# Patient Record
Sex: FEMALE | Race: WHITE | NOT HISPANIC OR LATINO | Employment: FULL TIME | URBAN - METROPOLITAN AREA
[De-identification: names, ages, dates, MRNs, and addresses within clinical notes are randomized per-mention and may not be internally consistent; named-entity substitution may affect disease eponyms.]

---

## 2023-03-29 DIAGNOSIS — F33.41 RECURRENT MAJOR DEPRESSIVE DISORDER, IN PARTIAL REMISSION (CMS-HCC): Primary | ICD-10-CM

## 2023-03-29 RX ORDER — BUPROPION HYDROCHLORIDE 300 MG/1
TABLET ORAL
Qty: 30 TABLET | Refills: 0 | Status: SHIPPED | OUTPATIENT
Start: 2023-03-29 | End: 2023-05-03

## 2023-05-03 DIAGNOSIS — F33.41 RECURRENT MAJOR DEPRESSIVE DISORDER, IN PARTIAL REMISSION (CMS-HCC): ICD-10-CM

## 2023-05-03 RX ORDER — BUPROPION HYDROCHLORIDE 300 MG/1
TABLET ORAL
Qty: 30 TABLET | Refills: 0 | Status: SHIPPED | OUTPATIENT
Start: 2023-05-03 | End: 2023-07-03

## 2023-05-04 LAB
CLUE CELLS: PRESENT
NUGENT SCORE: 8
YEAST: ABNORMAL

## 2023-05-31 ENCOUNTER — HOSPITAL ENCOUNTER (OUTPATIENT)
Dept: DATA CONVERSION | Facility: HOSPITAL | Age: 48
End: 2023-05-31
Attending: SURGERY | Admitting: SURGERY
Payer: COMMERCIAL

## 2023-05-31 DIAGNOSIS — Z88.0 ALLERGY STATUS TO PENICILLIN: ICD-10-CM

## 2023-05-31 DIAGNOSIS — N60.22 FIBROADENOSIS OF LEFT BREAST: ICD-10-CM

## 2023-05-31 DIAGNOSIS — D24.2 BENIGN NEOPLASM OF LEFT BREAST: ICD-10-CM

## 2023-05-31 DIAGNOSIS — Z88.1 ALLERGY STATUS TO OTHER ANTIBIOTIC AGENTS: ICD-10-CM

## 2023-05-31 LAB
ANION GAP IN SER/PLAS: 11 MMOL/L (ref 10–20)
CALCIUM (MG/DL) IN SER/PLAS: 9 MG/DL (ref 8.6–10.3)
CARBON DIOXIDE, TOTAL (MMOL/L) IN SER/PLAS: 26 MMOL/L (ref 21–32)
CHLORIDE (MMOL/L) IN SER/PLAS: 105 MMOL/L (ref 98–107)
CREATININE (MG/DL) IN SER/PLAS: 0.6 MG/DL (ref 0.5–1.05)
ERYTHROCYTE DISTRIBUTION WIDTH (RATIO) BY AUTOMATED COUNT: 12.6 % (ref 11.5–14.5)
ERYTHROCYTE MEAN CORPUSCULAR HEMOGLOBIN CONCENTRATION (G/DL) BY AUTOMATED: 33.3 G/DL (ref 32–36)
ERYTHROCYTE MEAN CORPUSCULAR VOLUME (FL) BY AUTOMATED COUNT: 92 FL (ref 80–100)
ERYTHROCYTES (10*6/UL) IN BLOOD BY AUTOMATED COUNT: 3.93 X10E12/L (ref 4–5.2)
GFR FEMALE: >90 ML/MIN/1.73M2
GLUCOSE (MG/DL) IN SER/PLAS: 105 MG/DL (ref 74–99)
HCG, URINE: NEGATIVE
HEMATOCRIT (%) IN BLOOD BY AUTOMATED COUNT: 36 % (ref 36–46)
HEMOGLOBIN (G/DL) IN BLOOD: 12 G/DL (ref 12–16)
LEUKOCYTES (10*3/UL) IN BLOOD BY AUTOMATED COUNT: 4.2 X10E9/L (ref 4.4–11.3)
NRBC (PER 100 WBCS) BY AUTOMATED COUNT: 0 /100 WBC (ref 0–0)
PLATELETS (10*3/UL) IN BLOOD AUTOMATED COUNT: 229 X10E9/L (ref 150–450)
POTASSIUM (MMOL/L) IN SER/PLAS: 3.9 MMOL/L (ref 3.5–5.3)
SODIUM (MMOL/L) IN SER/PLAS: 138 MMOL/L (ref 136–145)
UREA NITROGEN (MG/DL) IN SER/PLAS: 8 MG/DL (ref 6–23)

## 2023-06-08 LAB
COMPLETE PATHOLOGY REPORT: NORMAL
CONVERTED CLINICAL DIAGNOSIS-HISTORY: NORMAL
CONVERTED FINAL DIAGNOSIS: NORMAL
CONVERTED FINAL REPORT PDF LINK TO COPY AND PASTE: NORMAL
CONVERTED GROSS DESCRIPTION: NORMAL

## 2023-06-27 DIAGNOSIS — F32.0 CURRENT MILD EPISODE OF MAJOR DEPRESSIVE DISORDER WITHOUT PRIOR EPISODE (CMS-HCC): Primary | ICD-10-CM

## 2023-06-27 RX ORDER — BUPROPION HYDROCHLORIDE 150 MG/1
TABLET ORAL
Qty: 30 TABLET | Refills: 0 | Status: SHIPPED | OUTPATIENT
Start: 2023-06-27 | End: 2023-07-27

## 2023-06-30 DIAGNOSIS — F33.41 RECURRENT MAJOR DEPRESSIVE DISORDER, IN PARTIAL REMISSION (CMS-HCC): ICD-10-CM

## 2023-07-03 RX ORDER — BUPROPION HYDROCHLORIDE 300 MG/1
TABLET ORAL
Qty: 30 TABLET | Refills: 0 | Status: SHIPPED | OUTPATIENT
Start: 2023-07-03 | End: 2023-08-07

## 2023-07-27 DIAGNOSIS — F32.0 CURRENT MILD EPISODE OF MAJOR DEPRESSIVE DISORDER WITHOUT PRIOR EPISODE (CMS-HCC): ICD-10-CM

## 2023-07-27 RX ORDER — BUPROPION HYDROCHLORIDE 150 MG/1
TABLET ORAL
Qty: 30 TABLET | Refills: 0 | Status: SHIPPED | OUTPATIENT
Start: 2023-07-27 | End: 2023-11-06 | Stop reason: WASHOUT

## 2023-08-04 DIAGNOSIS — F33.41 RECURRENT MAJOR DEPRESSIVE DISORDER, IN PARTIAL REMISSION (CMS-HCC): ICD-10-CM

## 2023-08-07 RX ORDER — BUPROPION HYDROCHLORIDE 300 MG/1
300 TABLET ORAL DAILY
Qty: 30 TABLET | Refills: 3 | Status: SHIPPED | OUTPATIENT
Start: 2023-08-07 | End: 2024-05-21 | Stop reason: SDUPTHER

## 2023-09-07 VITALS — WEIGHT: 145.28 LBS | BODY MASS INDEX: 22.8 KG/M2 | HEIGHT: 67 IN

## 2023-09-19 ENCOUNTER — LAB (OUTPATIENT)
Dept: LAB | Facility: LAB | Age: 48
End: 2023-09-19
Payer: COMMERCIAL

## 2023-09-19 ENCOUNTER — OFFICE VISIT (OUTPATIENT)
Dept: PRIMARY CARE | Facility: CLINIC | Age: 48
End: 2023-09-19
Payer: COMMERCIAL

## 2023-09-19 VITALS
RESPIRATION RATE: 16 BRPM | WEIGHT: 158 LBS | BODY MASS INDEX: 24.75 KG/M2 | HEART RATE: 74 BPM | OXYGEN SATURATION: 99 % | SYSTOLIC BLOOD PRESSURE: 114 MMHG | DIASTOLIC BLOOD PRESSURE: 84 MMHG | TEMPERATURE: 97.8 F

## 2023-09-19 DIAGNOSIS — R04.0 EPISTAXIS, RECURRENT: ICD-10-CM

## 2023-09-19 DIAGNOSIS — J30.1 SEASONAL ALLERGIC RHINITIS DUE TO POLLEN: Primary | ICD-10-CM

## 2023-09-19 PROBLEM — R92.8 ABNORMAL SCREENING MAMMOGRAM: Status: ACTIVE | Noted: 2023-09-19

## 2023-09-19 PROBLEM — F32.A DEPRESSION: Status: ACTIVE | Noted: 2023-09-19

## 2023-09-19 PROBLEM — J30.9 ALLERGIC RHINITIS: Status: ACTIVE | Noted: 2023-09-19

## 2023-09-19 PROBLEM — F33.9 EPISODE OF RECURRENT MAJOR DEPRESSIVE DISORDER (CMS-HCC): Status: ACTIVE | Noted: 2023-09-19

## 2023-09-19 LAB
ERYTHROCYTE DISTRIBUTION WIDTH (RATIO) BY AUTOMATED COUNT: 12.7 % (ref 11.5–14.5)
ERYTHROCYTE MEAN CORPUSCULAR HEMOGLOBIN CONCENTRATION (G/DL) BY AUTOMATED: 33.2 G/DL (ref 32–36)
ERYTHROCYTE MEAN CORPUSCULAR VOLUME (FL) BY AUTOMATED COUNT: 95 FL (ref 80–100)
ERYTHROCYTES (10*6/UL) IN BLOOD BY AUTOMATED COUNT: 4.16 X10E12/L (ref 4–5.2)
HEMATOCRIT (%) IN BLOOD BY AUTOMATED COUNT: 39.7 % (ref 36–46)
HEMOGLOBIN (G/DL) IN BLOOD: 13.2 G/DL (ref 12–16)
LEUKOCYTES (10*3/UL) IN BLOOD BY AUTOMATED COUNT: 6.6 X10E9/L (ref 4.4–11.3)
PLATELETS (10*3/UL) IN BLOOD AUTOMATED COUNT: 279 X10E9/L (ref 150–450)

## 2023-09-19 PROCEDURE — 85027 COMPLETE CBC AUTOMATED: CPT

## 2023-09-19 PROCEDURE — 36415 COLL VENOUS BLD VENIPUNCTURE: CPT

## 2023-09-19 PROCEDURE — 99213 OFFICE O/P EST LOW 20 MIN: CPT | Performed by: FAMILY MEDICINE

## 2023-09-19 RX ORDER — LEVONORGESTREL 52 MG/1
1 INTRAUTERINE DEVICE INTRAUTERINE ONCE
COMMUNITY
Start: 2019-05-08

## 2023-09-19 RX ORDER — LORATADINE 10 MG/1
10 TABLET ORAL DAILY
COMMUNITY
End: 2023-09-19

## 2023-09-19 ASSESSMENT — ENCOUNTER SYMPTOMS
CARDIOVASCULAR NEGATIVE: 1
RESPIRATORY NEGATIVE: 1
CONSTITUTIONAL NEGATIVE: 1

## 2023-09-19 NOTE — PROGRESS NOTES
Subjective   Patient ID: Sixto De La Cruz is a 48 y.o. female who presents for Epistaxis (Nose Bleed).    Patient has had nosebleeds off and on for few years.  Always on the left side.  No trauma, does have a history of allergies but she has been on allergy shots.  They have not  improved or gotten worse since she began her allergy shots.  She has not tried anything with them.  She does not pick at it.  Does not do anything that would irritate it.  She states now they are starting to happen while she is in public.  Usually she starts to feel it on the back of her throat first and started bleeding.  No headaches, no bruising anywhere else         Review of Systems   Constitutional: Negative.    HENT:  Positive for nosebleeds.    Respiratory: Negative.     Cardiovascular: Negative.        Objective   /84 (BP Location: Left arm, Patient Position: Sitting, BP Cuff Size: Adult)   Pulse 74   Temp 36.6 °C (97.8 °F)   Resp 16   Wt 71.7 kg (158 lb)   SpO2 99%   BMI 24.75 kg/m²     Physical Exam  Constitutional:       Appearance: Normal appearance.   HENT:      Head: Normocephalic.      Nose: Nose normal. No congestion or rhinorrhea.      Comments: No obvious signs of the source of her bleeding     Mouth/Throat:      Mouth: Mucous membranes are moist.   Eyes:      Pupils: Pupils are equal, round, and reactive to light.   Cardiovascular:      Rate and Rhythm: Normal rate and regular rhythm.   Pulmonary:      Effort: Pulmonary effort is normal.      Breath sounds: Normal breath sounds.   Neurological:      Mental Status: She is alert.         Assessment/Plan   Problem List Items Addressed This Visit       Allergic rhinitis - Primary     Other Visit Diagnoses       Epistaxis, recurrent        Relevant Orders    CBC    Referral to ENT          Patient is trying nasal saline twice a day.  Try to avoid any contact nose that may cause bleeding.  She will see ENT get the blood work that was ordered.  Patient aware if any  increase symptoms, any bruising, worsening bleeding, any changes notify the office or go to the emergency room.  Follow-up in 2 weeks

## 2023-09-30 NOTE — H&P
"    History & Physical Reviewed:   Pregnant/Lactating:  ·  Are You Pregnant no (1)   ·  Are You Currently Breastfeeding no (1)     I have reviewed the History and Physical dated:  23-May-2023   History and Physical reviewed and relevant findings noted. Patient examined to review pertinent physical  findings.: No significant changes   Home Medications Reviewed: no changes noted   Allergies Reviewed: no changes noted       ERAS (Enhanced Recovery After Surgery):  ·  ERAS Patient: no     Consent:   COVID-19 Consent:  ·  COVID-19 Risk Consent Surgeon has reviewed key risks related to the risk of henny COVID-19 and if they contract COVID-19 what the risks are.       Electronic Signatures:  Vanessa Gonzalez ()  (Signed 31-May-2023 08:57)   Authored: History & Physical Reviewed, ERAS, Consent,  Note Completion      Last Updated: 31-May-2023 08:57 by Vanessa Gonzalez ()    References:  1.  Data Referenced From \"Patient Profile - Preop v3\" 31-May-2023 07:32   "

## 2023-10-02 NOTE — OP NOTE
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 LEFT MAG SEED LOCALIZED EXCISIONAL BREAST BIOPSY    Anesthesia: No anesthesiologist associated with this case  Estimated Blood Loss: 2ml  Findings: 1. seed, clip present on specimen xray        Operative Report:   The patient underwent pre-operative magnetic seed localization in the radiology department prior to surgery.  Localization studies were reviewed confirming  appropriate localization.  The patient was then transported to the operative suite where a sign-in was performed verifying patient identity, procedure and laterality.  One dose of preoperative antibiotics was given.  After induction of anesthesia the  left breast and axilla were prepped and draped in the usual sterile fashion.  The sentimag probe was balanced and then used to identify the non-palpable lesion in the breast when the target area marked on the skin.  A periareolar incision was then made,  and flaps were raised anteriorly in the direction of the targeted lesion.  Probe was used to confirm the presence of the lesion.  Circumferential dissection was carried out to include the targeted lesion and localization seed.  The probe was used to again  confirm the presence of the localization seed within the specimen. Once the specimen was completed dissected it was oriented with a silk suture- short suture superior, long suture lateral and passed off the field.  A specimen radiograph was performed  which confirmed the presence of the lesion, biopsy clip, and localization seed.  One medium surgical clip was used to denote the area of resection. No additional nipple discharge observed after excision of the mass. The dermis was closed with 3-0 Vicryl  suture and the skin was closed with a running 4-0  Monocryl.  Benzoin and steri-strips were used as dressing.  The patient was then awoken from anesthesia and transported to the PACU in satisfactory condition.    SPECIMEN:  1. Left breast magseed localized excisional biopsy- short suture superior, long lateral                        Attestation:   Note Completion:  Attending Attestation I performed the procedure without a resident         Electronic Signatures:  Vanessa Gonzalez ()  (Signed 31-May-2023 09:59)   Authored: Post-Operative Note, Chart Review, Note Completion      Last Updated: 31-May-2023 09:59 by Vanessa Gonzalez ()

## 2023-10-18 PROBLEM — G47.00 INSOMNIA: Status: ACTIVE | Noted: 2023-10-18
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
## 2023-10-18 PROBLEM — R53.83 FATIGUE: Status: ACTIVE | Noted: 2023-10-18

## 2023-10-18 PROBLEM — N64.52 NIPPLE DISCHARGE: Status: ACTIVE | Noted: 2023-10-18

## 2023-10-18 PROBLEM — S82.309A FRACTURE OF DISTAL END OF TIBIA: Status: ACTIVE | Noted: 2017-03-28

## 2023-10-18 PROBLEM — M94.262 CHONDROMALACIA, KNEE, LEFT: Status: ACTIVE | Noted: 2017-12-29

## 2023-10-18 PROBLEM — R60.0 LOCALIZED EDEMA: Status: ACTIVE | Noted: 2023-10-18

## 2023-10-18 PROBLEM — S82.432D: Status: ACTIVE | Noted: 2017-04-14

## 2023-10-18 PROBLEM — L98.9 SKIN LESION OF BACK: Status: ACTIVE | Noted: 2023-10-18

## 2023-10-18 PROBLEM — D24.2 INTRADUCTAL PAPILLOMA OF LEFT BREAST: Status: ACTIVE | Noted: 2023-10-18

## 2023-10-18 PROBLEM — T75.3XXA MOTION SICKNESS: Status: ACTIVE | Noted: 2023-10-18

## 2023-10-18 PROBLEM — R35.0 URINARY FREQUENCY: Status: ACTIVE | Noted: 2023-10-18

## 2023-10-18 PROBLEM — B96.89 BACTERIAL VAGINOSIS: Status: ACTIVE | Noted: 2023-10-18

## 2023-10-18 PROBLEM — N76.0 VAGINITIS: Status: ACTIVE | Noted: 2023-10-18

## 2023-10-18 PROBLEM — M79.89 AXILLARY SWELLING: Status: ACTIVE | Noted: 2023-10-18

## 2023-10-18 PROBLEM — H91.90 HEARING LOSS: Status: ACTIVE | Noted: 2023-10-18

## 2023-10-18 PROBLEM — E55.9 VITAMIN D DEFICIENCY: Status: ACTIVE | Noted: 2023-10-18

## 2023-10-18 PROBLEM — H93.19 TINNITUS: Status: ACTIVE | Noted: 2023-10-18

## 2023-10-18 PROBLEM — N76.0 BACTERIAL VAGINOSIS: Status: ACTIVE | Noted: 2023-10-18

## 2023-10-18 RX ORDER — ASPIRIN 81 MG/1
81 TABLET ORAL DAILY
COMMUNITY
End: 2023-10-30 | Stop reason: HOSPADM

## 2023-10-18 RX ORDER — BUPROPION HYDROCHLORIDE 150 MG/1
1 TABLET ORAL DAILY
COMMUNITY
Start: 2019-09-09 | End: 2023-11-20

## 2023-10-18 RX ORDER — FLUOXETINE 10 MG/1
10 CAPSULE ORAL
COMMUNITY
Start: 2015-08-31 | End: 2023-10-30 | Stop reason: HOSPADM

## 2023-10-19 ENCOUNTER — APPOINTMENT (OUTPATIENT)
Dept: OTOLARYNGOLOGY | Facility: CLINIC | Age: 48
End: 2023-10-19
Payer: COMMERCIAL

## 2023-10-30 ENCOUNTER — OFFICE VISIT (OUTPATIENT)
Dept: OTOLARYNGOLOGY | Facility: CLINIC | Age: 48
End: 2023-10-30
Payer: COMMERCIAL

## 2023-10-30 VITALS
RESPIRATION RATE: 18 BRPM | BODY MASS INDEX: 23.54 KG/M2 | DIASTOLIC BLOOD PRESSURE: 80 MMHG | WEIGHT: 150 LBS | SYSTOLIC BLOOD PRESSURE: 117 MMHG | TEMPERATURE: 97.9 F | HEART RATE: 61 BPM | HEIGHT: 67 IN

## 2023-10-30 DIAGNOSIS — R04.0 EPISTAXIS, RECURRENT: Primary | ICD-10-CM

## 2023-10-30 DIAGNOSIS — J34.2 NASAL SEPTAL DEVIATION: ICD-10-CM

## 2023-10-30 PROCEDURE — 99213 OFFICE O/P EST LOW 20 MIN: CPT | Performed by: STUDENT IN AN ORGANIZED HEALTH CARE EDUCATION/TRAINING PROGRAM

## 2023-10-30 PROCEDURE — 30901 CONTROL OF NOSEBLEED: CPT | Performed by: STUDENT IN AN ORGANIZED HEALTH CARE EDUCATION/TRAINING PROGRAM

## 2023-10-30 PROCEDURE — 99203 OFFICE O/P NEW LOW 30 MIN: CPT | Performed by: STUDENT IN AN ORGANIZED HEALTH CARE EDUCATION/TRAINING PROGRAM

## 2023-10-30 PROCEDURE — 1036F TOBACCO NON-USER: CPT | Performed by: STUDENT IN AN ORGANIZED HEALTH CARE EDUCATION/TRAINING PROGRAM

## 2023-10-30 RX ORDER — MUPIROCIN 20 MG/G
OINTMENT TOPICAL
Qty: 15 G | Refills: 1 | Status: SHIPPED | OUTPATIENT
Start: 2023-10-30 | End: 2024-05-21

## 2023-10-30 ASSESSMENT — PATIENT HEALTH QUESTIONNAIRE - PHQ9
SUM OF ALL RESPONSES TO PHQ9 QUESTIONS 1 AND 2: 0
1. LITTLE INTEREST OR PLEASURE IN DOING THINGS: NOT AT ALL
2. FEELING DOWN, DEPRESSED OR HOPELESS: NOT AT ALL

## 2023-10-30 ASSESSMENT — COLUMBIA-SUICIDE SEVERITY RATING SCALE - C-SSRS
6. HAVE YOU EVER DONE ANYTHING, STARTED TO DO ANYTHING, OR PREPARED TO DO ANYTHING TO END YOUR LIFE?: NO
2. HAVE YOU ACTUALLY HAD ANY THOUGHTS OF KILLING YOURSELF?: NO
1. IN THE PAST MONTH, HAVE YOU WISHED YOU WERE DEAD OR WISHED YOU COULD GO TO SLEEP AND NOT WAKE UP?: NO

## 2023-10-30 ASSESSMENT — ENCOUNTER SYMPTOMS
DEPRESSION: 0
OCCASIONAL FEELINGS OF UNSTEADINESS: 0
LOSS OF SENSATION IN FEET: 0

## 2023-10-30 ASSESSMENT — PAIN SCALES - GENERAL: PAINLEVEL: 0-NO PAIN

## 2023-10-30 NOTE — LETTER
October 30, 2023     [place]  [address]  [facility], [address]  [facility] [address]    Patient: Sixto De La Cruz   YOB: 1975   Date of Visit: 10/30/2023       Dear Dr. Sushant Donahue, DO:    Thank you for referring Sixto De La Cruz to me for evaluation. Below are my notes for this consultation.  If you have questions, please do not hesitate to call me. I look forward to following your patient along with you.       Sincerely,     Malvin Bhat MD      CC: No Recipients  ______________________________________________________________________________________    SUBJECTIVE  Patient ID: Sixto De La Cruz is a 48 y.o. female who presents for New Patient Visit (Epistaxis recurrent/).    Referred by Dr. Donahue.    The patient reports left-sided epistaxis. Has been an issue for about six years.  Occurs roughly once a month.  She reports that she will get isolated episodes of significant bleeding, usually at awkward times.  Last bleed was last weekend. She is not on any AC; chart reports ASA which she denies. Typically has low blood pressure. No family history of epistaxis.  Has used saline spray in the past.    Review of Systems  Complete ROS negative except as noted above or on patient intake form and as above.    OBJECTIVE  Physical Exam  CONSTITUTIONAL: Well appearing female who appears stated age.  PSYCHIATRIC: Alert, appropriate mood and affect.  RESPIRATORY: Normal inspiration and expiration and chest wall expansion; no use of accessory muscles to breathe.  VOICE: Clear speech without hoarseness. No stridor nor stertor.  HEAD, FACE, AND SKIN: Symmetric facial features.  EARS: External ears were normally formed with no lesions. The external auditory canals were clear. The tympanic membranes were intact and in the neutral position. No significant retraction pockets, effusions, nor hemotympanum were appreciated.  NOSE: Nasal dorsum was midline. Anterior rhinoscopy  demonstrated a septum deviated to the left.  There is some prominent blush versus superficial capillaries along the superior septum overlying the swell body.  Otherwise no obvious nasal masses, polyps, mucopurulence, nor other lesions were appreciated.  OROPHARYNX: No lesion nor mucosal abnormality. The uvula was normal appearing.  No blood in the oropharynx.    --------------------------------------------------  Procedure: Control of epistaxis, simple  Indication: Epistaxis, left   Informed consent verbally obtained: The risks, benefits, alternatives, and expectations were discussed with the patient, who wished to proceed  Anesthesia: Topical lidocaine and oxymetazoline    After anesthesia and decongestion the nasal cavities were examined with a a headlight.  Silver nitrate cautery was applied to the left septum.  Once hemostasis was achieved the silver nitrate was deactivated with the use of saline.  Surgicel was then draped over the area of cautery to aid in healing.  Finally, bacitracin ointment was applied to the nasal sill.    There were no complications and the patient tolerated the procedure well.  --------------------------------------------------    ASSESSMENT/PLAN  Diagnoses and all orders for this visit:  Epistaxis, recurrent  -     Referral to ENT  -     mupirocin (Bactroban) 2 % ointment; Apply a pea-sized amount to bilateral nostril(s) every 12 hours.  Nasal septal deviation    48 y.o. female referred for recurrent left-sided epistaxis.    1.  Recurrent left-sided epistaxis, prominent vessel area along anterior septum  We discussed the patient's physical exam findings and I offered reassurance.  The patient has evidence of bleeding from an area along the superior anterior septum just posterior to Kiesselbach's plexus.  This is an area located along the swell body of the nasal septum.  We discussed the patient's options at this time, which include continued observation, medical therapy, and or  procedural intervention.  The patient opted for procedural intervention and cautery of this area was successfully performed.    Written instruction regarding post-visit care as well as instructions on how to manage future epistaxis episodes was provided.    I recommended follow-up in 3 to 4 weeks to check on her healing.  If she does well she can follow-up with me as needed.    This note was created using speech recognition transcription software. Despite proofreading, typographical errors may be present that affect the meaning of the content. Please contact my office with any questions.

## 2023-10-30 NOTE — PROGRESS NOTES
SUBJECTIVE  Patient ID: Sixto De La Cruz is a 48 y.o. female who presents for New Patient Visit (Epistaxis recurrent/).    Referred by Dr. Donahue.    The patient reports left-sided epistaxis. Has been an issue for about six years.  Occurs roughly once a month.  She reports that she will get isolated episodes of significant bleeding, usually at awkward times.  Last bleed was last weekend. She is not on any AC; chart reports ASA which she denies. Typically has low blood pressure. No family history of epistaxis.  Has used saline spray in the past.    Review of Systems  Complete ROS negative except as noted above or on patient intake form and as above.    OBJECTIVE  Physical Exam  CONSTITUTIONAL: Well appearing female who appears stated age.  PSYCHIATRIC: Alert, appropriate mood and affect.  RESPIRATORY: Normal inspiration and expiration and chest wall expansion; no use of accessory muscles to breathe.  VOICE: Clear speech without hoarseness. No stridor nor stertor.  HEAD, FACE, AND SKIN: Symmetric facial features.  EARS: External ears were normally formed with no lesions. The external auditory canals were clear. The tympanic membranes were intact and in the neutral position. No significant retraction pockets, effusions, nor hemotympanum were appreciated.  NOSE: Nasal dorsum was midline. Anterior rhinoscopy demonstrated a septum deviated to the left.  There is some prominent blush versus superficial capillaries along the superior septum overlying the swell body.  Otherwise no obvious nasal masses, polyps, mucopurulence, nor other lesions were appreciated.  OROPHARYNX: No lesion nor mucosal abnormality. The uvula was normal appearing.  No blood in the oropharynx.    --------------------------------------------------  Procedure: Control of epistaxis, simple  Indication: Epistaxis, left   Informed consent verbally obtained: The risks, benefits, alternatives, and expectations were discussed with the patient, who wished to  proceed  Anesthesia: Topical lidocaine and oxymetazoline    After anesthesia and decongestion the nasal cavities were examined with a a headlight.  Silver nitrate cautery was applied to the left septum.  Once hemostasis was achieved the silver nitrate was deactivated with the use of saline.  Surgicel was then draped over the area of cautery to aid in healing.  Finally, bacitracin ointment was applied to the nasal sill.    There were no complications and the patient tolerated the procedure well.  --------------------------------------------------    ASSESSMENT/PLAN  Diagnoses and all orders for this visit:  Epistaxis, recurrent  -     Referral to ENT  -     mupirocin (Bactroban) 2 % ointment; Apply a pea-sized amount to bilateral nostril(s) every 12 hours.  Nasal septal deviation    48 y.o. female referred for recurrent left-sided epistaxis.    1.  Recurrent left-sided epistaxis, prominent vessel area along anterior septum  We discussed the patient's physical exam findings and I offered reassurance.  The patient has evidence of bleeding from an area along the superior anterior septum just posterior to Kiesselbach's plexus.  This is an area located along the swell body of the nasal septum.  We discussed the patient's options at this time, which include continued observation, medical therapy, and or procedural intervention.  The patient opted for procedural intervention and cautery of this area was successfully performed.    Written instruction regarding post-visit care as well as instructions on how to manage future epistaxis episodes was provided.    I recommended follow-up in 3 to 4 weeks to check on her healing.  If she does well she can follow-up with me as needed.    This note was created using speech recognition transcription software. Despite proofreading, typographical errors may be present that affect the meaning of the content. Please contact my office with any questions.

## 2023-11-06 ENCOUNTER — OFFICE VISIT (OUTPATIENT)
Dept: OBSTETRICS AND GYNECOLOGY | Facility: CLINIC | Age: 48
End: 2023-11-06
Payer: COMMERCIAL

## 2023-11-06 VITALS
HEIGHT: 67 IN | WEIGHT: 161.13 LBS | BODY MASS INDEX: 25.29 KG/M2 | DIASTOLIC BLOOD PRESSURE: 70 MMHG | SYSTOLIC BLOOD PRESSURE: 110 MMHG

## 2023-11-06 DIAGNOSIS — L91.8 SKIN TAG: ICD-10-CM

## 2023-11-06 PROCEDURE — 99213 OFFICE O/P EST LOW 20 MIN: CPT | Performed by: OBSTETRICS & GYNECOLOGY

## 2023-11-06 PROCEDURE — 1036F TOBACCO NON-USER: CPT | Performed by: OBSTETRICS & GYNECOLOGY

## 2023-11-06 RX ORDER — LIDOCAINE HYDROCHLORIDE 10 MG/ML
3 INJECTION INFILTRATION; PERINEURAL ONCE
Status: DISCONTINUED | OUTPATIENT
Start: 2023-11-06 | End: 2023-11-15

## 2023-11-06 RX ADMIN — LIDOCAINE HYDROCHLORIDE 3 ML: 10 INJECTION, SOLUTION EPIDURAL; INFILTRATION; INTRACAUDAL; PERINEURAL at 12:37

## 2023-11-06 NOTE — PROGRESS NOTES
Patient ID: Sixto De La Cruz is a 48 y.o. female.    Biopsy vulva    Date/Time: 11/6/2023 10:48 AM    Performed by: Odalys Munguia MD  Authorized by: Odalys Munguia MD    Consent:     Consent obtained:  Verbal and written    Consent given by:  Patient    Risks discussed:  Bleeding, infection and pain  Universal protocol:     Procedure explained and questions answered to patient or proxy's satisfaction: yes      Site/side marked: yes      Patient identity confirmed:  Verbally with patient  Pre-procedure details:     Skin preparation:  Povidone-iodine  Anesthesia:     Anesthesia method:  Local infiltration  Post-procedure details:     Procedure completion:  Tolerated well, no immediate complications     Wants hymenal skin tag removed.    Iodine skin prep.   1 % lidocaine . Lot# [id number] Exp: 04/2025   Excised middle tag w small scissors . 3 - 0 sutures placed x 3 . Good hemostasis.   Follow up 2 weeks.    Odalys Munguia MD

## 2023-11-18 DIAGNOSIS — F32.0 CURRENT MILD EPISODE OF MAJOR DEPRESSIVE DISORDER WITHOUT PRIOR EPISODE (CMS-HCC): Primary | ICD-10-CM

## 2023-11-19 RX ORDER — LIDOCAINE HYDROCHLORIDE 10 MG/ML
3 INJECTION INFILTRATION; PERINEURAL ONCE
Status: DISCONTINUED | OUTPATIENT
Start: 2023-11-19 | End: 2024-01-18

## 2023-11-20 ENCOUNTER — OFFICE VISIT (OUTPATIENT)
Dept: OBSTETRICS AND GYNECOLOGY | Facility: CLINIC | Age: 48
End: 2023-11-20
Payer: COMMERCIAL

## 2023-11-20 VITALS
BODY MASS INDEX: 25.74 KG/M2 | WEIGHT: 164 LBS | SYSTOLIC BLOOD PRESSURE: 112 MMHG | HEIGHT: 67 IN | DIASTOLIC BLOOD PRESSURE: 80 MMHG

## 2023-11-20 DIAGNOSIS — L91.8 SKIN TAG: Primary | ICD-10-CM

## 2023-11-20 PROCEDURE — 99213 OFFICE O/P EST LOW 20 MIN: CPT | Performed by: OBSTETRICS & GYNECOLOGY

## 2023-11-20 PROCEDURE — 56515 DESTROY VULVA LESION/S COMPL: CPT | Performed by: OBSTETRICS & GYNECOLOGY

## 2023-11-20 PROCEDURE — 1036F TOBACCO NON-USER: CPT | Performed by: OBSTETRICS & GYNECOLOGY

## 2023-11-20 RX ORDER — BUPROPION HYDROCHLORIDE 150 MG/1
TABLET ORAL
Qty: 30 TABLET | Refills: 0 | Status: SHIPPED | OUTPATIENT
Start: 2023-11-20 | End: 2023-12-19

## 2023-11-20 RX ORDER — LIDOCAINE HYDROCHLORIDE 10 MG/ML
5 INJECTION INFILTRATION; PERINEURAL ONCE
Status: DISCONTINUED | OUTPATIENT
Start: 2023-11-20 | End: 2024-01-18

## 2023-11-20 RX ADMIN — LIDOCAINE HYDROCHLORIDE 3 ML: 10 INJECTION, SOLUTION EPIDURAL; INFILTRATION; INTRACAUDAL; PERINEURAL at 12:40

## 2023-11-20 NOTE — PROGRESS NOTES
Subjective   Patient ID: Sixto De La Cruz is a 48 y.o. female who presents for Skin Tag.  HPI  Has 2 hymenal skin tags she wants removed, present there x 6 months.  I removed a third one 2 weeks ago.   No pain or bleeding from this site.      Review of Systems  Neg    Objective   Physical Exam  Physical Exam  Constitutional:       Appearance: Normal appearance.     Abdominal:      General: Abdomen is flat.      Palpations: Abdomen is soft. There is no mass.   Genitourinary:     Labia:  No lesions  and No rash.       Urethra: No urethral lesion.      Vagina: Normal mucosa, pink and no discharge. Two enlarged hymenal skin tags             Assessment/Plan   Follow up in 2 weeks   SKIN TAG REMOVAL ( not biopsy)   Biopsy vulva    Date/Time: 11/20/2023 12:22 PM    Performed by: Odalys Munguia MD  Authorized by: Odalys Munguia MD    Consent:     Consent obtained:  Verbal    Consent given by:  Patient    Risks, benefits, and alternatives were discussed: yes      Risks discussed:  Bleeding, infection and pain    Alternatives discussed:  No treatment  Universal protocol:     Procedure explained and questions answered to patient or proxy's satisfaction: yes      Site/side marked: yes    Indications:     Indications:  Painful hymenal skin tags  Pre-procedure details:     Skin preparation:  Povidone-iodine  Sedation:     Sedation type:  None  Anesthesia:     Anesthesia method:  Local infiltration    Local anesthetic:  Lidocaine 1% w/o epi  Procedure specific details:      2 Isolated hymenal tags on each side.   Numbed w 2.5cc Lidocaine each.  Excised w sharp scissors. Sutured base w 4-0 vicryl sutures. Good hemostasis   Post-procedure details:     Procedure completion:  Tolerated well, no immediate complications       Odalys Munguia MD

## 2023-11-21 ENCOUNTER — APPOINTMENT (OUTPATIENT)
Dept: OTOLARYNGOLOGY | Facility: CLINIC | Age: 48
End: 2023-11-21
Payer: COMMERCIAL

## 2023-12-04 ENCOUNTER — OFFICE VISIT (OUTPATIENT)
Dept: OBSTETRICS AND GYNECOLOGY | Facility: CLINIC | Age: 48
End: 2023-12-04
Payer: COMMERCIAL

## 2023-12-04 VITALS
SYSTOLIC BLOOD PRESSURE: 114 MMHG | WEIGHT: 164.25 LBS | BODY MASS INDEX: 25.73 KG/M2 | DIASTOLIC BLOOD PRESSURE: 78 MMHG

## 2023-12-04 DIAGNOSIS — L91.8 SKIN TAG: Primary | ICD-10-CM

## 2023-12-04 PROCEDURE — 1036F TOBACCO NON-USER: CPT | Performed by: OBSTETRICS & GYNECOLOGY

## 2023-12-04 PROCEDURE — 99213 OFFICE O/P EST LOW 20 MIN: CPT | Performed by: OBSTETRICS & GYNECOLOGY

## 2023-12-04 NOTE — PROGRESS NOTES
Subjective   Patient ID: Sixto De La Cruz is a 48 y.o. female who presents for Suture / Staple Removal. Suture check from skin tag removal on 11/20/23.    HPI  Had hymenal tags removed. Doing well.     Review of Systems  Neg   Objective   Physical Exam  Small sutures still present on hymen ring.    No infection. No discharge. No lesions.     Assessment/Plan   Diagnoses and all orders for this visit:  Skin tag    All sutures removed fr skin on hymen. Mucosa flat and well healed. Some spotting from suture removal.     Follow up yearly    Odalys Munguia MD

## 2023-12-19 DIAGNOSIS — F32.0 CURRENT MILD EPISODE OF MAJOR DEPRESSIVE DISORDER WITHOUT PRIOR EPISODE (CMS-HCC): ICD-10-CM

## 2023-12-19 RX ORDER — BUPROPION HYDROCHLORIDE 150 MG/1
TABLET ORAL
Qty: 30 TABLET | Refills: 0 | Status: SHIPPED | OUTPATIENT
Start: 2023-12-19 | End: 2024-05-21 | Stop reason: SDUPTHER

## 2023-12-22 ENCOUNTER — TELEPHONE (OUTPATIENT)
Dept: OBSTETRICS AND GYNECOLOGY | Facility: CLINIC | Age: 48
End: 2023-12-22
Payer: COMMERCIAL

## 2023-12-22 DIAGNOSIS — N89.8 VAGINAL DISCHARGE: Primary | ICD-10-CM

## 2023-12-22 NOTE — TELEPHONE ENCOUNTER
Patient called in wanting to know if they can get a prescription for vaginosis. Patient said they were recommended a gel and just wanted information on the gel. Please advise.       No recent sex, white discharge x 2 weeks w sour odor. Ok to trt w metrogel x 5 days.    Odalys Munguia MD

## 2023-12-27 RX ORDER — METRONIDAZOLE 7.5 MG/G
GEL VAGINAL DAILY
Qty: 70 G | Refills: 0 | Status: SHIPPED | OUTPATIENT
Start: 2023-12-27 | End: 2024-01-01

## 2024-01-18 RX ORDER — LIDOCAINE HYDROCHLORIDE 10 MG/ML
3 INJECTION, SOLUTION EPIDURAL; INFILTRATION; INTRACAUDAL; PERINEURAL ONCE
Status: COMPLETED | OUTPATIENT
Start: 2024-01-18 | End: 2023-11-20

## 2024-01-18 RX ORDER — LIDOCAINE HYDROCHLORIDE 10 MG/ML
3 INJECTION, SOLUTION EPIDURAL; INFILTRATION; INTRACAUDAL; PERINEURAL ONCE
Status: COMPLETED | OUTPATIENT
Start: 2024-01-18 | End: 2023-11-06

## 2024-05-21 ENCOUNTER — LAB (OUTPATIENT)
Dept: LAB | Facility: LAB | Age: 49
End: 2024-05-21
Payer: COMMERCIAL

## 2024-05-21 ENCOUNTER — OFFICE VISIT (OUTPATIENT)
Dept: PRIMARY CARE | Facility: CLINIC | Age: 49
End: 2024-05-21
Payer: COMMERCIAL

## 2024-05-21 VITALS
BODY MASS INDEX: 26.47 KG/M2 | SYSTOLIC BLOOD PRESSURE: 102 MMHG | TEMPERATURE: 98.4 F | DIASTOLIC BLOOD PRESSURE: 74 MMHG | RESPIRATION RATE: 16 BRPM | HEART RATE: 84 BPM | OXYGEN SATURATION: 97 % | WEIGHT: 169 LBS

## 2024-05-21 DIAGNOSIS — F33.41 RECURRENT MAJOR DEPRESSIVE DISORDER, IN PARTIAL REMISSION (CMS-HCC): ICD-10-CM

## 2024-05-21 DIAGNOSIS — Z00.00 HEALTHCARE MAINTENANCE: Primary | ICD-10-CM

## 2024-05-21 DIAGNOSIS — F32.0 CURRENT MILD EPISODE OF MAJOR DEPRESSIVE DISORDER WITHOUT PRIOR EPISODE (CMS-HCC): ICD-10-CM

## 2024-05-21 DIAGNOSIS — F33.0 MILD EPISODE OF RECURRENT MAJOR DEPRESSIVE DISORDER (CMS-HCC): ICD-10-CM

## 2024-05-21 DIAGNOSIS — Z00.00 HEALTHCARE MAINTENANCE: ICD-10-CM

## 2024-05-21 LAB
ALBUMIN SERPL BCP-MCNC: 4.3 G/DL (ref 3.4–5)
ALP SERPL-CCNC: 40 U/L (ref 33–110)
ALT SERPL W P-5'-P-CCNC: 11 U/L (ref 7–45)
ANION GAP SERPL CALC-SCNC: 10 MMOL/L (ref 10–20)
AST SERPL W P-5'-P-CCNC: 17 U/L (ref 9–39)
BILIRUB SERPL-MCNC: 0.6 MG/DL (ref 0–1.2)
BUN SERPL-MCNC: 10 MG/DL (ref 6–23)
CALCIUM SERPL-MCNC: 9 MG/DL (ref 8.6–10.3)
CHLORIDE SERPL-SCNC: 103 MMOL/L (ref 98–107)
CHOLEST SERPL-MCNC: 215 MG/DL (ref 0–199)
CHOLESTEROL/HDL RATIO: 2.6
CO2 SERPL-SCNC: 26 MMOL/L (ref 21–32)
CREAT SERPL-MCNC: 0.76 MG/DL (ref 0.5–1.05)
EGFRCR SERPLBLD CKD-EPI 2021: >90 ML/MIN/1.73M*2
ERYTHROCYTE [DISTWIDTH] IN BLOOD BY AUTOMATED COUNT: 13.1 % (ref 11.5–14.5)
GLUCOSE SERPL-MCNC: 92 MG/DL (ref 74–99)
HCT VFR BLD AUTO: 37.5 % (ref 36–46)
HDLC SERPL-MCNC: 83.7 MG/DL
HGB BLD-MCNC: 12.6 G/DL (ref 12–16)
LDLC SERPL CALC-MCNC: 90 MG/DL
MCH RBC QN AUTO: 31.7 PG (ref 26–34)
MCHC RBC AUTO-ENTMCNC: 33.6 G/DL (ref 32–36)
MCV RBC AUTO: 94 FL (ref 80–100)
NON HDL CHOLESTEROL: 131 MG/DL (ref 0–149)
NRBC BLD-RTO: 0 /100 WBCS (ref 0–0)
PLATELET # BLD AUTO: 228 X10*3/UL (ref 150–450)
POTASSIUM SERPL-SCNC: 3.9 MMOL/L (ref 3.5–5.3)
PROT SERPL-MCNC: 7 G/DL (ref 6.4–8.2)
RBC # BLD AUTO: 3.98 X10*6/UL (ref 4–5.2)
SODIUM SERPL-SCNC: 135 MMOL/L (ref 136–145)
TRIGL SERPL-MCNC: 209 MG/DL (ref 0–149)
VLDL: 42 MG/DL (ref 0–40)
WBC # BLD AUTO: 6.6 X10*3/UL (ref 4.4–11.3)

## 2024-05-21 PROCEDURE — 99396 PREV VISIT EST AGE 40-64: CPT | Performed by: FAMILY MEDICINE

## 2024-05-21 PROCEDURE — 80053 COMPREHEN METABOLIC PANEL: CPT

## 2024-05-21 PROCEDURE — 80061 LIPID PANEL: CPT

## 2024-05-21 PROCEDURE — 1036F TOBACCO NON-USER: CPT | Performed by: FAMILY MEDICINE

## 2024-05-21 PROCEDURE — 36415 COLL VENOUS BLD VENIPUNCTURE: CPT

## 2024-05-21 PROCEDURE — 85027 COMPLETE CBC AUTOMATED: CPT

## 2024-05-21 RX ORDER — BUPROPION HYDROCHLORIDE 150 MG/1
TABLET ORAL
Qty: 90 TABLET | Refills: 1 | Status: SHIPPED | OUTPATIENT
Start: 2024-05-21

## 2024-05-21 RX ORDER — BUPROPION HYDROCHLORIDE 300 MG/1
300 TABLET ORAL DAILY
Qty: 90 TABLET | Refills: 1 | Status: SHIPPED | OUTPATIENT
Start: 2024-05-21

## 2024-05-21 ASSESSMENT — ENCOUNTER SYMPTOMS
HEMATOLOGIC/LYMPHATIC NEGATIVE: 1
RESPIRATORY NEGATIVE: 1
COUGH: 0
MUSCULOSKELETAL NEGATIVE: 1
ENDOCRINE NEGATIVE: 1
ALLERGIC/IMMUNOLOGIC NEGATIVE: 1
VOMITING: 0
WHEEZING: 0
DIZZINESS: 0
EYES NEGATIVE: 1
CONSTIPATION: 0
CARDIOVASCULAR NEGATIVE: 1
SHORTNESS OF BREATH: 0
PSYCHIATRIC NEGATIVE: 1
DIARRHEA: 0
NUMBNESS: 0
CONSTITUTIONAL NEGATIVE: 1
WEAKNESS: 0
NEUROLOGICAL NEGATIVE: 1
FEVER: 0
ABDOMINAL PAIN: 0
HEADACHES: 0
GASTROINTESTINAL NEGATIVE: 1

## 2024-05-21 NOTE — PROGRESS NOTES
Subjective   Patient ID: Sixto De La Cruz is a 48 y.o. female who presents for Med Refill.    Patient overall is doing okay.  No chest pain or shortness of breath.  She still has some days where depression seems a little worse.  But she does not take the Wellbutrin every day either.  No SI or HI thoughts.  She is has more stress at home with 2 teenagers.  Normal bowel bladder.  She sees OB.  She is due for mammogram.    Med Refill  Pertinent negatives include no abdominal pain, chest pain, coughing, fever, headaches, numbness, rash, vomiting or weakness.        Review of Systems   Constitutional: Negative.  Negative for fever.   HENT: Negative.     Eyes: Negative.    Respiratory: Negative.  Negative for cough, shortness of breath and wheezing.    Cardiovascular: Negative.  Negative for chest pain and leg swelling.   Gastrointestinal: Negative.  Negative for abdominal pain, constipation, diarrhea and vomiting.   Endocrine: Negative.    Genitourinary: Negative.    Musculoskeletal: Negative.    Skin: Negative.  Negative for rash.   Allergic/Immunologic: Negative.    Neurological: Negative.  Negative for dizziness, weakness, numbness and headaches.   Hematological: Negative.    Psychiatric/Behavioral: Negative.         Objective   /74 (BP Location: Left arm, Patient Position: Sitting, BP Cuff Size: Adult)   Pulse 84   Temp 36.9 °C (98.4 °F)   Resp 16   Wt 76.7 kg (169 lb)   SpO2 97%   BMI 26.47 kg/m²     Physical Exam  Vitals and nursing note reviewed.   Constitutional:       General: She is not in acute distress.     Appearance: Normal appearance. She is not ill-appearing.   HENT:      Head: Normocephalic.      Right Ear: Tympanic membrane and ear canal normal.      Left Ear: Tympanic membrane and ear canal normal.      Nose: Nose normal.      Mouth/Throat:      Mouth: Mucous membranes are moist.      Pharynx: Oropharynx is clear.   Eyes:      Extraocular Movements: Extraocular movements intact.       Conjunctiva/sclera: Conjunctivae normal.      Pupils: Pupils are equal, round, and reactive to light.   Cardiovascular:      Rate and Rhythm: Normal rate and regular rhythm.      Pulses: Normal pulses.   Pulmonary:      Effort: Pulmonary effort is normal. No respiratory distress.      Breath sounds: No wheezing, rhonchi or rales.   Abdominal:      General: Bowel sounds are normal.      Palpations: Abdomen is soft.      Tenderness: There is no guarding.      Hernia: No hernia is present.   Musculoskeletal:         General: Normal range of motion.      Cervical back: Neck supple.      Right lower leg: No edema.      Left lower leg: No edema.   Skin:     General: Skin is warm.      Capillary Refill: Capillary refill takes less than 2 seconds.   Neurological:      General: No focal deficit present.      Mental Status: She is oriented to person, place, and time.      Cranial Nerves: No cranial nerve deficit.      Sensory: No sensory deficit.      Gait: Gait normal.      Deep Tendon Reflexes: Reflexes normal.   Psychiatric:         Mood and Affect: Mood normal.         Behavior: Behavior normal.         Judgment: Judgment normal.         Assessment/Plan   Problem List Items Addressed This Visit       Depression    Relevant Medications    buPROPion XL (Wellbutrin XL) 300 mg 24 hr tablet    buPROPion XL (Wellbutrin XL) 150 mg 24 hr tablet    Episode of recurrent major depressive disorder (CMS-HCC)     Other Visit Diagnoses       Healthcare maintenance    -  Primary    Relevant Orders    CBC    Comprehensive Metabolic Panel    Lipid Panel    BI mammo bilateral screening tomosynthesis    Colonoscopy Screening; Average Risk Patient          Patient is can to see psychology.  2 names given for different groups.  We may need to adjust her medication.  Patient aware if any chest pain shortness of breath any nausea vomiting diarrhea fever headache any SI or HI thoughts any harmful thoughts or self or others notify the office to go  to the ER  Follow-up in 3 months

## 2024-05-22 NOTE — RESULT ENCOUNTER NOTE
Sixto your glucose, liver and kidney function appeared fine  Your blood counts were normal no anemia  Your total cholesterol was 215 but your good cholesterol was 83 which is really good.  Your triglycerides are slightly elevated so can work on trying to lower carbohydrates intake

## 2024-06-10 ENCOUNTER — HOSPITAL ENCOUNTER (OUTPATIENT)
Dept: RADIOLOGY | Facility: HOSPITAL | Age: 49
Discharge: HOME | End: 2024-06-10
Payer: COMMERCIAL

## 2024-06-10 VITALS — HEIGHT: 67 IN | WEIGHT: 169 LBS | BODY MASS INDEX: 26.53 KG/M2

## 2024-06-10 DIAGNOSIS — Z00.00 HEALTHCARE MAINTENANCE: ICD-10-CM

## 2024-06-10 DIAGNOSIS — Z12.31 ENCOUNTER FOR SCREENING MAMMOGRAM FOR MALIGNANT NEOPLASM OF BREAST: ICD-10-CM

## 2024-06-10 PROCEDURE — 77063 BREAST TOMOSYNTHESIS BI: CPT

## 2024-06-10 PROCEDURE — 77067 SCR MAMMO BI INCL CAD: CPT | Performed by: STUDENT IN AN ORGANIZED HEALTH CARE EDUCATION/TRAINING PROGRAM

## 2024-06-10 PROCEDURE — 77063 BREAST TOMOSYNTHESIS BI: CPT | Performed by: STUDENT IN AN ORGANIZED HEALTH CARE EDUCATION/TRAINING PROGRAM

## 2024-06-10 PROCEDURE — 77067 SCR MAMMO BI INCL CAD: CPT

## 2024-06-17 DIAGNOSIS — N63.10 MASS OF RIGHT BREAST, UNSPECIFIED QUADRANT: Primary | ICD-10-CM

## 2024-06-21 ENCOUNTER — APPOINTMENT (OUTPATIENT)
Dept: DERMATOLOGY | Facility: CLINIC | Age: 49
End: 2024-06-21
Payer: COMMERCIAL

## 2024-06-21 DIAGNOSIS — Z12.83 ENCOUNTER FOR SCREENING FOR MALIGNANT NEOPLASM OF SKIN: Primary | ICD-10-CM

## 2024-06-21 DIAGNOSIS — D18.01 HEMANGIOMA OF SKIN: ICD-10-CM

## 2024-06-21 DIAGNOSIS — D22.71 MELANOCYTIC NEVI OF RIGHT LOWER LIMB, INCLUDING HIP: ICD-10-CM

## 2024-06-21 DIAGNOSIS — L81.4 LENTIGO: ICD-10-CM

## 2024-06-21 DIAGNOSIS — L57.8 PHOTOAGING OF SKIN: ICD-10-CM

## 2024-06-21 DIAGNOSIS — L72.0 MILIA: ICD-10-CM

## 2024-06-21 DIAGNOSIS — D22.60 MELANOCYTIC NEVI OF UNSPECIFIED UPPER LIMB, INCLUDING SHOULDER: ICD-10-CM

## 2024-06-21 DIAGNOSIS — D22.72 MELANOCYTIC NEVI OF LEFT LOWER EXTREMITY OR HIP: ICD-10-CM

## 2024-06-21 DIAGNOSIS — L82.1 SEBORRHEIC KERATOSIS: ICD-10-CM

## 2024-06-21 PROCEDURE — 99203 OFFICE O/P NEW LOW 30 MIN: CPT | Performed by: DERMATOLOGY

## 2024-06-21 PROCEDURE — 1036F TOBACCO NON-USER: CPT | Performed by: DERMATOLOGY

## 2024-06-21 ASSESSMENT — DERMATOLOGY PATIENT ASSESSMENT
DO YOU HAVE IRREGULAR MENSTRUAL CYCLES: NO
DO YOU USE SUNSCREEN: OCCASIONALLY
DO YOU USE A TANNING BED: NO
HAVE YOU HAD OR DO YOU HAVE A STAPH INFECTION: NO
ARE YOU AN ORGAN TRANSPLANT RECIPIENT: NO
WHAT TYPE OF BIRTH CONTROL: MIRENA
HAVE YOU HAD OR DO YOU HAVE VASCULAR DISEASE: NO
ARE YOU TRYING TO GET PREGNANT: NO
DO YOU HAVE ANY NEW OR CHANGING LESIONS: YES
ARE YOU ON BIRTH CONTROL: YES

## 2024-06-21 ASSESSMENT — DERMATOLOGY QUALITY OF LIFE (QOL) ASSESSMENT
WHAT SINGLE SKIN CONDITION LISTED BELOW IS THE PATIENT ANSWERING THE QUALITY-OF-LIFE ASSESSMENT QUESTIONS ABOUT: NONE OF THE ABOVE
RATE HOW BOTHERED YOU ARE BY SYMPTOMS OF YOUR SKIN PROBLEM (EG, ITCHING, STINGING BURNING, HURTING OR SKIN IRRITATION): 6 - ALWAYS BOTHERED
ARE THERE EXCLUSIONS OR EXCEPTIONS FOR THE QUALITY OF LIFE ASSESSMENT: NO
RATE HOW BOTHERED YOU ARE BY EFFECTS OF YOUR SKIN PROBLEMS ON YOUR ACTIVITIES (EG, GOING OUT, ACCOMPLISHING WHAT YOU WANT, WORK ACTIVITIES OR YOUR RELATIONSHIPS WITH OTHERS): 0 - NEVER BOTHERED
RATE HOW EMOTIONALLY BOTHERED YOU ARE BY YOUR SKIN PROBLEM (FOR EXAMPLE, WORRY, EMBARRASSMENT, FRUSTRATION): 0 - NEVER BOTHERED
DATE THE QUALITY-OF-LIFE ASSESSMENT WAS COMPLETED: 67012

## 2024-06-21 ASSESSMENT — ITCH NUMERIC RATING SCALE: HOW SEVERE IS YOUR ITCHING?: 0

## 2024-06-21 ASSESSMENT — PATIENT GLOBAL ASSESSMENT (PGA): PATIENT GLOBAL ASSESSMENT: PATIENT GLOBAL ASSESSMENT:  2 - MILD

## 2024-06-21 NOTE — PROGRESS NOTES
Subjective     Sixto De La Cruz is a 48 y.o. female who presents for the following: Skin Check (Pt here for FBSE with family hx NMSC - Father. Pt reports areas of concern on nose, back, right shoulder, and inframammary creases. ).     Review of Systems:  No other skin or systemic complaints other than what is documented elsewhere in the note.    The following portions of the chart were reviewed this encounter and updated as appropriate:         Skin Cancer History  No skin cancer on file.      Specialty Problems          Dermatology Problems    Skin lesion of back        Objective   Well appearing patient in no apparent distress; mood and affect are within normal limits.    A full examination was performed including scalp, head, eyes, ears, nose, lips, neck, chest, axillae, abdomen, back, buttocks, bilateral upper extremities, bilateral lower extremities, hands, feet, fingers, toes, fingernails, and toenails. All findings within normal limits unless otherwise noted below.    Assessment/Plan   1. Encounter for screening for malignant neoplasm of skin  No suspicious lesions noted on examination today    The risk of chronic, cumulative sun damage and risk of development of skin cancer was reviewed today.   The importance of sun protection was reviewed: including the use of a broad spectrum sunscreen of at least SPF 30 that protects against both UVA/UVB rays, with ingredients such as Zinc oxide or titanium dioxide, wearing sun protective clothing and sun avoidance. We reviewed the warning signs of non-melanoma skin cancer and ABCDEs of melanoma  Please follow up should you notice any new or changing pre-existing skin lesion.    2. Photoaging of skin  Mottled pigmentation with telangiectasias and brown reticular macules in sun exposed areas of the body.    The risk of chronic, cumulative sun damage and risk of development of skin cancer was reviewed today.   The importance of sun protection was reviewed: including the use  of a broad spectrum sunscreen of at least SPF 30 that protects against both UVA/UVB rays, with ingredients such as Zinc oxide or titanium dioxide, wearing sun protective clothing and sun avoidance. We reviewed the warning signs of non-melanoma skin cancer and ABCDEs of melanoma  Please follow up should you notice any new or changing pre-existing skin lesion.    3. Hemangioma of skin  Cherry red papules    The benign nature of these skin lesions were reviewed, no treatment is necessary.   Please follow up for any new or pre-existing lesion that is changing in size, shape, color, becomes painful, tender, itches or bleed.    4. Seborrheic keratosis (5)  Left Inframammary Fold, Left Upper Back, Right Inframammary Fold, Right Malar Cheek, Right Upper Back  Brown, tan waxy macules and stuck on appearing papules and plaques    The benign nature of these skin lesions reviewed, reassure provided and no further treatment needed at this time.   These lesions can be removed, if symptomatic (itching, bleeding, rubbing on clothing, painful), otherwise removal is considered cosmetic.     5. Lentigo  Scattered tan macules in sun-exposed areas.    These are benign skin lesions due to sun exposure. They will darken in response to sun exposure. They should be monitored for change in size, shape or color.  These lesions can be treated cosmetically with topical creams, liquid nitrogen and a variety of lasers.    6. Milia  Right Malar Cheek  Small 1-2 mm white papules.    Benign, reassured    7. Melanocytic nevi of unspecified upper limb, including shoulder (3)  Left Arm, Right Arm, Torso - Posterior (Back)  Scattered, uniform and benign-appearing, regular brown melanocytic papules and macules.    Clinically benign appearing nevi, no treatment is necessary.  The importance of sun protection was reviewed: including the use of a broad spectrum sunscreen that protects against both UVA/UVB rays, with ingredients such as Zinc oxide or  titanium dioxide, wearing sun protective clothing and sun avoidance.   ABCDEs of melanoma reviewed.  Please follow up should you notice any new or changing pre-existing skin lesion.    8. Melanocytic nevi of left lower extremity or hip  Left Leg  Scattered, uniform and benign-appearing, regular brown melanocytic papules and macules.    Clinically benign appearing nevi, no treatment is necessary.  The importance of sun protection was reviewed: including the use of a broad spectrum sunscreen that protects against both UVA/UVB rays, with ingredients such as Zinc oxide or titanium dioxide, wearing sun protective clothing and sun avoidance.   ABCDEs of melanoma reviewed.  Please follow up should you notice any new or changing pre-existing skin lesion.    9. Melanocytic nevi of right lower limb, including hip  Right Leg  Scattered, uniform and benign-appearing, regular brown melanocytic papules and macules.    Clinically benign appearing nevi, no treatment is necessary.  The importance of sun protection was reviewed: including the use of a broad spectrum sunscreen that protects against both UVA/UVB rays, with ingredients such as Zinc oxide or titanium dioxide, wearing sun protective clothing and sun avoidance.   ABCDEs of melanoma reviewed.  Please follow up should you notice any new or changing pre-existing skin lesion.      Follow up as needed

## 2024-06-24 ENCOUNTER — HOSPITAL ENCOUNTER (OUTPATIENT)
Dept: RADIOLOGY | Facility: HOSPITAL | Age: 49
Discharge: HOME | End: 2024-06-24
Payer: COMMERCIAL

## 2024-06-24 DIAGNOSIS — N63.10 MASS OF RIGHT BREAST, UNSPECIFIED QUADRANT: ICD-10-CM

## 2024-06-24 PROCEDURE — 76642 ULTRASOUND BREAST LIMITED: CPT | Mod: RT

## 2024-06-24 PROCEDURE — 76642 ULTRASOUND BREAST LIMITED: CPT | Mod: RIGHT SIDE | Performed by: RADIOLOGY

## 2024-06-24 PROCEDURE — 76982 USE 1ST TARGET LESION: CPT | Mod: RT

## 2024-07-08 ENCOUNTER — OFFICE VISIT (OUTPATIENT)
Dept: PRIMARY CARE | Facility: CLINIC | Age: 49
End: 2024-07-08
Payer: COMMERCIAL

## 2024-07-08 VITALS
SYSTOLIC BLOOD PRESSURE: 115 MMHG | DIASTOLIC BLOOD PRESSURE: 71 MMHG | HEART RATE: 71 BPM | TEMPERATURE: 98 F | BODY MASS INDEX: 25.53 KG/M2 | RESPIRATION RATE: 18 BRPM | OXYGEN SATURATION: 98 % | WEIGHT: 163 LBS

## 2024-07-08 DIAGNOSIS — S69.92XA INJURY OF LEFT HAND, INITIAL ENCOUNTER: ICD-10-CM

## 2024-07-08 DIAGNOSIS — Z23 IMMUNIZATION DUE: Primary | ICD-10-CM

## 2024-07-08 PROCEDURE — 90471 IMMUNIZATION ADMIN: CPT

## 2024-07-08 PROCEDURE — 99213 OFFICE O/P EST LOW 20 MIN: CPT

## 2024-07-08 PROCEDURE — 90715 TDAP VACCINE 7 YRS/> IM: CPT

## 2024-07-08 PROCEDURE — 1036F TOBACCO NON-USER: CPT

## 2024-07-08 ASSESSMENT — ENCOUNTER SYMPTOMS
WOUND: 1
FEVER: 0
SHORTNESS OF BREATH: 0
LIGHT-HEADEDNESS: 0
NAUSEA: 0
VOMITING: 0
DIZZINESS: 0

## 2024-07-08 NOTE — ASSESSMENT & PLAN NOTE
Pt has superficial cut on palmar surface of L hand, no noticed drainage or bleeding; bandaged appropriately. Pt given neosporin to put on wound and is to continue with proper cleansing and bandaging. Tdap booster given in office. Pt instructed to seek further care if she develops any systemic symptoms or infectious signs.

## 2024-07-08 NOTE — PROGRESS NOTES
Subjective   Sixto De La Cruz is a 49 y.o. female who presents for Hand Injury (Cut left hand last night on chicken wire fence.).  Pt was closing her chicken gate last night and fell and cut her hand on the wire. She cleaned it with peroxide and wound wash and bandaged it up. Denies any systemic symptoms or signs of infection but knows she is not up to date on her Tdap booster. She is open to getting booster today.     Hand Injury   Pertinent negatives include no chest pain.       Review of Systems   Constitutional:  Negative for fever.   Respiratory:  Negative for shortness of breath.    Cardiovascular:  Negative for chest pain.   Gastrointestinal:  Negative for nausea and vomiting.   Skin:  Positive for wound.   Neurological:  Negative for dizziness and light-headedness.   All other systems reviewed and are negative.      Objective   Physical Exam  Vitals reviewed.   Constitutional:       General: She is not in acute distress.     Appearance: Normal appearance. She is not toxic-appearing.   HENT:      Head: Normocephalic and atraumatic.      Nose: Nose normal.   Eyes:      Extraocular Movements: Extraocular movements intact.   Cardiovascular:      Rate and Rhythm: Normal rate and regular rhythm.      Heart sounds: No murmur heard.     No friction rub. No gallop.   Pulmonary:      Effort: Pulmonary effort is normal. No respiratory distress.      Breath sounds: Normal breath sounds. No wheezing, rhonchi or rales.   Skin:     General: Skin is warm.      Comments: Wound on palmar surface of L hand no signs of infection.    Neurological:      General: No focal deficit present.      Mental Status: She is alert.   Psychiatric:         Mood and Affect: Mood normal.         Behavior: Behavior normal.         Assessment/Plan   Problem List Items Addressed This Visit             ICD-10-CM    Immunization due - Primary Z23    Relevant Orders    Tdap vaccine, age 7 years and older (Completed)    Injury of left hand S69.92XA      Pt has superficial cut on palmar surface of L hand, no noticed drainage or bleeding; bandaged appropriately. Pt given neosporin to put on wound and is to continue with proper cleansing and bandaging. Tdap booster given in office. Pt instructed to seek further care if she develops any systemic symptoms or infectious signs.                  Bradly Kuhn DO

## 2024-07-08 NOTE — PROGRESS NOTES
I saw and evaluated the patient. I personally obtained the key and critical portions of the history and physical exam or was physically present for key and critical portions performed by the resident. I reviewed the resident/fellow's documentation and discussed the patient with the resident/fellow. I agree with the resident/fellow's medical decision making as documented in the note.  Patient had a minor cut.  She did cut on metal.  She needs her tetanus vaccine.  No signs of infection, appears to be healing well.  If any redness swelling any pain any worsening symptoms go to ER  Follow-up in 1 to 2 weeks if needed  Agree with assessment and plan    Sushant Donahue, DO

## 2024-07-31 ENCOUNTER — OFFICE VISIT (OUTPATIENT)
Dept: PRIMARY CARE | Facility: CLINIC | Age: 49
End: 2024-07-31
Payer: COMMERCIAL

## 2024-07-31 VITALS
TEMPERATURE: 98.5 F | HEART RATE: 83 BPM | SYSTOLIC BLOOD PRESSURE: 123 MMHG | DIASTOLIC BLOOD PRESSURE: 80 MMHG | WEIGHT: 167.13 LBS | OXYGEN SATURATION: 98 % | BODY MASS INDEX: 26.18 KG/M2 | RESPIRATION RATE: 16 BRPM

## 2024-07-31 DIAGNOSIS — H92.01 EARACHE ON RIGHT: Primary | ICD-10-CM

## 2024-07-31 PROCEDURE — 99213 OFFICE O/P EST LOW 20 MIN: CPT

## 2024-07-31 PROCEDURE — 1036F TOBACCO NON-USER: CPT

## 2024-07-31 RX ORDER — DOXYCYCLINE 100 MG/1
100 CAPSULE ORAL 2 TIMES DAILY
Qty: 14 CAPSULE | Refills: 0 | Status: SHIPPED | OUTPATIENT
Start: 2024-07-31 | End: 2024-08-07

## 2024-07-31 NOTE — PROGRESS NOTES
Subjective   Patient ID: Sixto De La Cruz is a 49 y.o. female who presents for Earache (Pt here today for possible to right ear).  Right ear pain  Patient presents for right-sided ear pain and decreased hearing for 4 days.  Symptoms began after scuba diving on Saturday.  She does have a history of prior right-sided ear infection after a dive and has been managing her current symptoms with Ciprodex drops without much relief and as needed ibuprofen which is somewhat helpful.  At this point the pain is radiating to her jaw when chewing.  She is not having any fevers, chills, dizziness, fluid leakage from the ear, tooth pain, congestion.  She has not had any recent dental work.          Objective   Physical Exam  Constitutional:       Appearance: She is not ill-appearing.   HENT:      Left Ear: Tympanic membrane, ear canal and external ear normal.      Ears:      Comments: Right ear with effusion and erythema at the TM, clear canals and external ears, no tenderness with movement of the external ear     Nose: No congestion.   Cardiovascular:      Rate and Rhythm: Normal rate.   Pulmonary:      Effort: Pulmonary effort is normal.      Breath sounds: Normal breath sounds.   Skin:     General: Skin is warm and dry.   Neurological:      General: No focal deficit present.      Mental Status: She is alert.   Psychiatric:         Mood and Affect: Mood normal.         Assessment/Plan   Problem List Items Addressed This Visit             ICD-10-CM    Earache on right - Primary H92.01     Patient presents with 4 days of ear pain, decreased hearing following a scuba dive. Exam is consistent with otitis media. Barotrauma less likely given lack of dizziness, visible TM trauma. Plan to treat for infection with doxycycline given penicillin allergy and follow up if symptoms are not improving. If so, could add Ciprodex prescription.          Relevant Medications    doxycycline (Vibramycin) 100 mg capsule          DO Laz Ramirez  Medicine Resident, PGY2   Clarisa Briceno DO 07/31/24 5:13 PM

## 2024-07-31 NOTE — ASSESSMENT & PLAN NOTE
Patient presents with 4 days of ear pain, decreased hearing following a scuba dive. Exam is consistent with otitis media. Barotrauma less likely given lack of dizziness, visible TM trauma. Plan to treat for infection with doxycycline given penicillin allergy and follow up if symptoms are not improving. If so, could add Ciprodex prescription.

## 2024-08-01 NOTE — PROGRESS NOTES
I saw and evaluated the patient. I personally obtained the key and critical portions of the history and physical exam or was physically present for key and critical portions performed by the resident. I reviewed the resident/fellow's documentation and discussed the patient with the resident/fellow. I agree with the resident/fellow's medical decision making as documented in the note.  Patient had right ear discomfort since scuba diving.  There is no signs of barotrauma.  Did appear to be slightly injected.  No tenderness with motion testing.  She ended up with otitis media the last time she went scuba diving.  Will treat the patient for this.  She is to call in the next 2 to 3 days.  If she still having discomfort may prescribe Ciprodex.  Patient is hearing fine if she starts have any fever chills any increase symptoms any pain any rash any increased creased pain headache discharge notify office or go to the ER  Follow-up in 1 week  Side effects of medication explained  Agree with assessment and plan  Sushant Donahue, DO

## 2024-11-18 DIAGNOSIS — F32.0 CURRENT MILD EPISODE OF MAJOR DEPRESSIVE DISORDER WITHOUT PRIOR EPISODE (CMS-HCC): ICD-10-CM

## 2024-11-18 DIAGNOSIS — F33.41 RECURRENT MAJOR DEPRESSIVE DISORDER, IN PARTIAL REMISSION (CMS-HCC): ICD-10-CM

## 2024-11-18 RX ORDER — BUPROPION HYDROCHLORIDE 150 MG/1
TABLET ORAL
Qty: 90 TABLET | Refills: 0 | Status: SHIPPED | OUTPATIENT
Start: 2024-11-18

## 2024-11-18 RX ORDER — BUPROPION HYDROCHLORIDE 300 MG/1
300 TABLET ORAL DAILY
Qty: 90 TABLET | Refills: 0 | Status: SHIPPED | OUTPATIENT
Start: 2024-11-18

## 2025-03-11 ENCOUNTER — APPOINTMENT (OUTPATIENT)
Dept: PRIMARY CARE | Facility: CLINIC | Age: 50
End: 2025-03-11
Payer: COMMERCIAL

## 2025-03-11 VITALS
RESPIRATION RATE: 16 BRPM | WEIGHT: 160 LBS | DIASTOLIC BLOOD PRESSURE: 80 MMHG | BODY MASS INDEX: 25.06 KG/M2 | SYSTOLIC BLOOD PRESSURE: 120 MMHG | HEART RATE: 88 BPM | OXYGEN SATURATION: 98 % | TEMPERATURE: 98.1 F

## 2025-03-11 DIAGNOSIS — G47.00 INSOMNIA, UNSPECIFIED TYPE: ICD-10-CM

## 2025-03-11 DIAGNOSIS — R23.2 HOT FLASHES: ICD-10-CM

## 2025-03-11 DIAGNOSIS — H91.93 BILATERAL HEARING LOSS, UNSPECIFIED HEARING LOSS TYPE: Primary | ICD-10-CM

## 2025-03-11 DIAGNOSIS — R41.840 ATTENTION DEFICIT: ICD-10-CM

## 2025-03-11 DIAGNOSIS — G44.89 OTHER HEADACHE SYNDROME: ICD-10-CM

## 2025-03-11 PROCEDURE — 99214 OFFICE O/P EST MOD 30 MIN: CPT | Performed by: FAMILY MEDICINE

## 2025-03-11 NOTE — PROGRESS NOTES
Subjective   Patient ID: Sixto De La Cruz is a 49 y.o. female who presents for Follow-up (Hormones hearing headaches focus trouble sleeping.).    Patient has a couple different things going on.  Patient states ever since she went to a concert is really loud and she had to go to the restroom to avoid the noise she had some ringing in her years, feels like she has had some hearing loss.  She would like to have an official hearing test.  She feels like she is having trouble with background noises  Patient is also had abnormal menstrual cycle.  She feels like she is in perimenopause.  Even though she has a Mirena it has not been helping her.  She is missing sometimes other times she will have more frequent menses, feel like they are getting little longer and heavier.  She also has trouble she feels like focusing, concentrating.  She is not sleeping well.  She is not sure if that this due to perimenopause or other reasons.  Patient has no SI or HI thoughts but she does start worried about things when she cannot sleep.  She feels more anxious.  Patient also has always gotten headaches.  Usually take a few days to go away, over-the-counter medications have not helped.  She does not get any vision changes no numbness tingling with these, the intensity and location is usually in the front sinus area.  The frequency has slowly changed to being a little bit more.  May get 1 almost every month.         Review of Systems   Constitutional: Negative.    HENT:  Positive for hearing loss.    Eyes: Negative.    Respiratory: Negative.     Cardiovascular: Negative.    Genitourinary: Negative.    Neurological:  Positive for headaches.   Psychiatric/Behavioral:  Positive for decreased concentration. The patient is nervous/anxious.        Objective   /80 (BP Location: Left arm, Patient Position: Sitting, BP Cuff Size: Adult)   Pulse 88   Temp 36.7 °C (98.1 °F)   Resp 16   Wt 72.6 kg (160 lb)   SpO2 98%   BMI 25.06 kg/m²      Physical Exam  Vitals and nursing note reviewed.   Constitutional:       General: She is not in acute distress.     Appearance: Normal appearance. She is not ill-appearing.   HENT:      Head: Normocephalic.      Right Ear: Tympanic membrane and ear canal normal.      Left Ear: Tympanic membrane and ear canal normal.      Nose: Nose normal.      Mouth/Throat:      Mouth: Mucous membranes are moist.      Pharynx: Oropharynx is clear.   Eyes:      Extraocular Movements: Extraocular movements intact.      Conjunctiva/sclera: Conjunctivae normal.      Pupils: Pupils are equal, round, and reactive to light.   Cardiovascular:      Rate and Rhythm: Normal rate and regular rhythm.      Pulses: Normal pulses.   Pulmonary:      Effort: Pulmonary effort is normal. No respiratory distress.      Breath sounds: No wheezing, rhonchi or rales.   Abdominal:      Palpations: Abdomen is soft.   Musculoskeletal:      Cervical back: Neck supple.   Skin:     General: Skin is warm.      Capillary Refill: Capillary refill takes less than 2 seconds.   Neurological:      General: No focal deficit present.      Mental Status: She is oriented to person, place, and time.      Cranial Nerves: No cranial nerve deficit.      Sensory: No sensory deficit.      Gait: Gait normal.      Deep Tendon Reflexes: Reflexes normal.   Psychiatric:         Mood and Affect: Mood normal.         Behavior: Behavior normal.         Judgment: Judgment normal.         Assessment/Plan   Problem List Items Addressed This Visit       Insomnia    Hearing loss - Primary    Relevant Orders    Referral to Audiology     Other Visit Diagnoses       Hot flashes        Relevant Orders    FSH & LH (Completed)    Estrogens, Total    Prolactin level (Completed)    Other headache syndrome        Relevant Medications    rimegepant (Nurtec ODT) 75 mg tablet,disintegrating    Attention deficit              Patient see audiology.  Will get blood work.  She may need to see  gynecology.  This all may be related to perimenopause however she may also have increased anxiety and/or primary insomnia.  Depending on her blood work, may need to start something to help her sleep like trazodone.  Will give the patient a sample of Nurtec to see if she is experiencing migraines.  She is to take 1.  Will see if this helps.  If not may need to see neurology and/or get imaging of her head  If any chest pain shortness of breath any nausea vomiting or fever or any increase symptoms any worsening headache any numbness tingling any SI or HI thoughts any numbness tingling any concerning symptoms notify the office or go to the ER  Follow-up in 1 month  Side effects medication were Splane to the patient

## 2025-03-12 LAB
ESTROGEN SERPL-MCNC: NORMAL PG/ML
FSH SERPL-ACNC: 6.5 MIU/ML
LH SERPL-ACNC: 5.1 MIU/ML
PROLACTIN SERPL-MCNC: 8 NG/ML

## 2025-03-12 ASSESSMENT — ENCOUNTER SYMPTOMS
HEADACHES: 1
NERVOUS/ANXIOUS: 1
RESPIRATORY NEGATIVE: 1
CONSTITUTIONAL NEGATIVE: 1
CARDIOVASCULAR NEGATIVE: 1
EYES NEGATIVE: 1
DECREASED CONCENTRATION: 1

## 2025-03-12 NOTE — RESULT ENCOUNTER NOTE
Sixto I am still waiting on your estrogen level but so far your hormone test appeared to be normal.  Appear to be more in the normal cycle range.  I will get back to you after I get the final results

## 2025-03-17 LAB
ESTROGEN SERPL-MCNC: 840 PG/ML
FSH SERPL-ACNC: 6.5 MIU/ML
LH SERPL-ACNC: 5.1 MIU/ML
PROLACTIN SERPL-MCNC: 8 NG/ML

## 2025-04-10 DIAGNOSIS — H92.03 EAR PAIN, BILATERAL: ICD-10-CM

## 2025-04-10 DIAGNOSIS — R23.2 HOT FLASHES: Primary | ICD-10-CM

## 2025-04-10 DIAGNOSIS — Z87.898 H/O MOTION SICKNESS: Primary | ICD-10-CM

## 2025-04-10 DIAGNOSIS — G44.89 OTHER HEADACHE SYNDROME: ICD-10-CM

## 2025-04-10 DIAGNOSIS — H60.339 SWIMMER'S EAR, UNSPECIFIED CHRONICITY, UNSPECIFIED LATERALITY: ICD-10-CM

## 2025-04-11 RX ORDER — OFLOXACIN 3 MG/ML
5 SOLUTION AURICULAR (OTIC) 2 TIMES DAILY
Qty: 5 ML | Refills: 0 | Status: SHIPPED | OUTPATIENT
Start: 2025-04-11 | End: 2025-04-18

## 2025-04-11 RX ORDER — SCOPOLAMINE 1 MG/3D
1 PATCH, EXTENDED RELEASE TRANSDERMAL
Qty: 10 PATCH | Refills: 0 | Status: SHIPPED | OUTPATIENT
Start: 2025-04-11 | End: 2025-06-10

## 2025-04-11 RX ORDER — DOXYCYCLINE 100 MG/1
100 CAPSULE ORAL 2 TIMES DAILY
Qty: 14 CAPSULE | Refills: 0 | Status: SHIPPED | OUTPATIENT
Start: 2025-04-11 | End: 2025-04-18

## 2025-04-11 NOTE — PROGRESS NOTES
Subjective   Patient ID: Sixto De La Cruz is a 49 y.o. female who presents for No chief complaint on file..    HPI     Review of Systems    Objective   There were no vitals taken for this visit.    Physical Exam    Assessment/Plan   Problem List Items Addressed This Visit    None  Visit Diagnoses       H/O motion sickness    -  Primary    Ear pain, bilateral            Patient was having sinus like symptoms earlier.  A lot of serous fluid behind the TMs.  Slightly irritated.  Likely has otitis externa and media.  Possible sinusitis since there is been no improvement.  Will try the antibiotics if this helps.  Patient aware if any increased headache, numbness tingling worsening ear pain fever chills discharge increased pain go to the ER  She is also to follow-up in 1 to 2 weeks  Side effects of medication explained to the patient

## 2025-04-14 DIAGNOSIS — H60.339 SWIMMER'S EAR, UNSPECIFIED CHRONICITY, UNSPECIFIED LATERALITY: ICD-10-CM

## 2025-04-14 DIAGNOSIS — H92.03 EAR PAIN, BILATERAL: ICD-10-CM

## 2025-04-14 RX ORDER — OFLOXACIN 3 MG/ML
5 SOLUTION AURICULAR (OTIC) 2 TIMES DAILY
Qty: 5 ML | Refills: 0 | Status: SHIPPED | OUTPATIENT
Start: 2025-04-14 | End: 2025-04-21

## 2025-05-13 ENCOUNTER — OFFICE VISIT (OUTPATIENT)
Dept: PRIMARY CARE | Facility: CLINIC | Age: 50
End: 2025-05-13
Payer: COMMERCIAL

## 2025-05-13 ENCOUNTER — HOSPITAL ENCOUNTER (OUTPATIENT)
Dept: RADIOLOGY | Facility: CLINIC | Age: 50
Discharge: HOME | End: 2025-05-13
Payer: COMMERCIAL

## 2025-05-13 VITALS
SYSTOLIC BLOOD PRESSURE: 115 MMHG | TEMPERATURE: 97.7 F | DIASTOLIC BLOOD PRESSURE: 67 MMHG | HEART RATE: 86 BPM | OXYGEN SATURATION: 98 % | WEIGHT: 161.13 LBS | BODY MASS INDEX: 25.24 KG/M2 | RESPIRATION RATE: 16 BRPM

## 2025-05-13 DIAGNOSIS — L24.9 IRRITANT CONTACT DERMATITIS, UNSPECIFIED TRIGGER: ICD-10-CM

## 2025-05-13 DIAGNOSIS — M79.674 PAIN OF TOE OF RIGHT FOOT: ICD-10-CM

## 2025-05-13 DIAGNOSIS — M79.674 PAIN OF TOE OF RIGHT FOOT: Primary | ICD-10-CM

## 2025-05-13 PROBLEM — S69.92XA INJURY OF LEFT HAND: Status: RESOLVED | Noted: 2024-07-08 | Resolved: 2025-05-13

## 2025-05-13 PROBLEM — Z23 IMMUNIZATION DUE: Status: RESOLVED | Noted: 2024-07-08 | Resolved: 2025-05-13
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
## 2025-05-13 PROBLEM — B96.89 BACTERIAL VAGINOSIS: Status: RESOLVED | Noted: 2023-10-18 | Resolved: 2025-05-13

## 2025-05-13 PROBLEM — H92.01 EARACHE ON RIGHT: Status: RESOLVED | Noted: 2024-07-31 | Resolved: 2025-05-13

## 2025-05-13 PROBLEM — M79.89 AXILLARY SWELLING: Status: RESOLVED | Noted: 2023-10-18 | Resolved: 2025-05-13

## 2025-05-13 PROBLEM — N76.0 BACTERIAL VAGINOSIS: Status: RESOLVED | Noted: 2023-10-18 | Resolved: 2025-05-13

## 2025-05-13 PROCEDURE — 99214 OFFICE O/P EST MOD 30 MIN: CPT

## 2025-05-13 PROCEDURE — 73660 X-RAY EXAM OF TOE(S): CPT | Mod: RT

## 2025-05-13 PROCEDURE — 73660 X-RAY EXAM OF TOE(S): CPT | Mod: RIGHT SIDE | Performed by: RADIOLOGY

## 2025-05-13 PROCEDURE — 1036F TOBACCO NON-USER: CPT

## 2025-05-13 RX ORDER — TRIAMCINOLONE ACETONIDE 1 MG/G
CREAM TOPICAL 2 TIMES DAILY
Qty: 30 G | Refills: 0 | Status: SHIPPED | OUTPATIENT
Start: 2025-05-13 | End: 2025-05-27

## 2025-05-13 ASSESSMENT — ENCOUNTER SYMPTOMS
SINUS PAIN: 0
CONSTIPATION: 0
WOUND: 0
ACTIVITY CHANGE: 0
EYE PAIN: 0
PALPITATIONS: 0
ARTHRALGIAS: 0
STRIDOR: 0
SORE THROAT: 0
EYE ITCHING: 0
TREMORS: 0
CHILLS: 0
CHEST TIGHTNESS: 0
MYALGIAS: 0
HEMATURIA: 0
BACK PAIN: 0
NECK PAIN: 0
COUGH: 0
EYE REDNESS: 0
LIGHT-HEADEDNESS: 0
NECK STIFFNESS: 0
DIARRHEA: 0
NUMBNESS: 0
FACIAL SWELLING: 0
DIZZINESS: 0
FREQUENCY: 0
APPETITE CHANGE: 0
RHINORRHEA: 0
FEVER: 0
NAUSEA: 0
SHORTNESS OF BREATH: 0
UNEXPECTED WEIGHT CHANGE: 0
WHEEZING: 0
SINUS PRESSURE: 0
VOMITING: 0
DYSURIA: 0
ABDOMINAL PAIN: 0
FATIGUE: 0
SEIZURES: 0
EYE DISCHARGE: 0
HEADACHES: 0
ABDOMINAL DISTENTION: 0

## 2025-05-13 NOTE — PROGRESS NOTES
Subjective   Sixto De La Cruz is a 49 y.o. female who presents for Toe Injury and Rash (Pt here for possible broken big toe less than a week ago. Also has rash on hands. ).  Patient is here for rash to bilateral hands and right great toe pain.  Patient states that 1 week ago she fell on a boat and stubbed her right great toe.  She notes immediate bruising and has been taking naproxen before switching to Tylenol ibuprofen.  She describes pain as dull, 5/10, worse with walking.   Patient states that at that time, she was on a diving trip in the [facility].  She did touch a rope that was coming off the boat with coral on it and noticed immediate pain and itching to her bilateral hands.  This progressed into a erythematous rash which she has been treating at home with over-the-counter cortisone cream, Claritin, Benadryl, topical Benadryl without much improvement.  She reports severe pruritus and some pain.      Review of Systems   Constitutional:  Negative for activity change, appetite change, chills, fatigue, fever and unexpected weight change.   HENT:  Negative for dental problem, ear pain, facial swelling, rhinorrhea, sinus pressure, sinus pain, sneezing, sore throat and tinnitus.    Eyes:  Negative for pain, discharge, redness and itching.   Respiratory:  Negative for cough, chest tightness, shortness of breath, wheezing and stridor.    Cardiovascular:  Negative for chest pain, palpitations and leg swelling.   Gastrointestinal:  Negative for abdominal distention, abdominal pain, constipation, diarrhea, nausea and vomiting.   Genitourinary:  Negative for decreased urine volume, dysuria, enuresis, frequency, hematuria and urgency.   Musculoskeletal:  Negative for arthralgias, back pain, myalgias, neck pain and neck stiffness.        Toe pain   Skin:  Positive for rash. Negative for pallor and wound.   Neurological:  Negative for dizziness, tremors, seizures, light-headedness, numbness and headaches.       Objective   BP  115/67 (BP Location: Right arm, Patient Position: Sitting)   Pulse 86   Temp 36.5 °C (97.7 °F) (Temporal)   Resp 16   Wt 73.1 kg (161 lb 2 oz)   SpO2 98%   BMI 25.24 kg/m²   Physical Exam  Vitals and nursing note reviewed.   Constitutional:       General: She is not in acute distress.     Appearance: Normal appearance. She is not ill-appearing or toxic-appearing.   HENT:      Head: Normocephalic and atraumatic.      Right Ear: External ear normal.      Left Ear: External ear normal.      Nose: Nose normal.      Mouth/Throat:      Mouth: Mucous membranes are moist.   Eyes:      Extraocular Movements: Extraocular movements intact.   Cardiovascular:      Rate and Rhythm: Normal rate and regular rhythm.   Pulmonary:      Effort: Pulmonary effort is normal. No respiratory distress.      Breath sounds: Normal breath sounds. No stridor. No wheezing or rhonchi.   Abdominal:      General: Abdomen is flat.      Tenderness: There is no abdominal tenderness. There is no guarding or rebound.   Musculoskeletal:         General: Normal range of motion.        Feet:    Feet:      Comments: TTP right great toe   Skin:     General: Skin is warm and dry.      Findings: Rash present.             Comments: Erythematous, patchy rash with scattered vesicles to bilateral palms    Neurological:      General: No focal deficit present.      Mental Status: She is alert and oriented to person, place, and time.         Assessment/Plan   Assessment & Plan  Pain of toe of right foot  - Low suspicion for fracture, but given severe ecchymosis will check x-rays   Orders:    XR toe right 2+ views; Future    Irritant contact dermatitis, unspecified trigger  - Suspect contact dermatitis   - Will treat with topical steroid for 2 weeks   - Follow up if not improving   Orders:    triamcinolone (Kenalog) 0.1 % cream; Apply topically 2 times a day for 14 days. Apply to hands 2 times daily. Avoid face and groin.        Discussed with attending physician  Dr. Donahue.     Tanner Tirado DO

## 2025-05-13 NOTE — PROGRESS NOTES
I saw and evaluated the patient. I personally obtained the key and critical portions of the history and physical exam or was physically present for key and critical portions performed by the resident. I reviewed the resident/fellow's documentation and discussed the patient with the resident/fellow. I agree with the resident/fellow's medical decision making as documented in the note.  Patient's hands do appear to be contact dermatitis.  More irritation.  Almost hive-like.  Patient's feet are slightly raised reddish dots.  No vesicles, are not appear petechiae.  Only a small spot.  Still some limited range of motion of the toe.  Patient is try triamcinolone cream, antihistamines.  Patient states that hands really itch and they do wax and wane in its intensity.  She is taking antihistamine.  Patient may see podiatry.  If this does not get any better may to see dermatology  If any chest pain shortness of breath tongue swelling swelling fever chills red streaks worsening rash notify office or go to ER  Follow-up in 1 week  Agree with assessment and plan    Sushant Donahue, DO

## 2025-05-15 DIAGNOSIS — S92.414A NONDISPLACED FRACTURE OF PROXIMAL PHALANX OF RIGHT GREAT TOE, INITIAL ENCOUNTER FOR CLOSED FRACTURE: Primary | ICD-10-CM

## 2025-05-15 NOTE — RESULT ENCOUNTER NOTE
Your x-ray does show a fracture of that toe.  It does appear may involve the joint.  I placed referral to see podiatry to evaluate.  I want to make sure it heals correctly so it does not affect your range of motion.  If you do not hear from them within a few days please let me know

## 2025-05-22 ENCOUNTER — APPOINTMENT (OUTPATIENT)
Facility: CLINIC | Age: 50
End: 2025-05-22
Payer: COMMERCIAL

## 2025-05-22 DIAGNOSIS — S92.414A NONDISPLACED FRACTURE OF PROXIMAL PHALANX OF RIGHT GREAT TOE, INITIAL ENCOUNTER FOR CLOSED FRACTURE: ICD-10-CM

## 2025-05-22 PROCEDURE — 99203 OFFICE O/P NEW LOW 30 MIN: CPT | Performed by: PODIATRIST

## 2025-05-22 PROCEDURE — 1036F TOBACCO NON-USER: CPT | Performed by: PODIATRIST

## 2025-05-22 NOTE — PROGRESS NOTES
Chief Complaint   Patient presents with    Toe Problem     History Of Present Illness  Sixto De La Cruz is a 49 y.o. female presenting with chief complaint of a right hallux fracture.  She suffered the injury while on a diving trip with her son.  This was approximately 3 weeks ago.  She did have bruising to the toe.  When she returned she did get an x-ray was found to have a fracture.  She presents today for evaluation.  She is able to ambulate in regular shoe gear without complication     Past Medical History  She has a past medical history of Major depressive disorder, recurrent, mild (09/09/2019).    Surgical History  She has a past surgical history that includes Other surgical history (07/13/2021) and Other surgical history (07/13/2021).     Social History  She reports that she has never smoked. She has never used smokeless tobacco. She reports current alcohol use. She reports that she does not use drugs.    Family History  Family History[1]     Allergies  Cefaclor, Erythromycin, Fluoxetine, Oxycodone-acetaminophen, and Penicillins    Medications  Current Medications[2]    Review of Systems    REVIEW OF SYSTEMS  GENERAL:  Negative for malaise, significant weight loss, fever      Objective: Right lower extremity focused  Vasc: DP and PT pulses are palpable.  CFT is less than 3 seconds. Skin temperature is warm to cool proximal to distal bilateral.      Neuro:  Light touch is intact to the foot     Derm: There is no edema to the right hallux.  Previously noted ecchymosis has resolved.  No fracture blisters    Ortho: Muscle strength is 5/5 for all pedal groups tested.  There is minimal pain to palpation across the base of the right distal phalanx of the hallux.  Normal range of motion to the IP joint    2 views of the right toes from 5/13/2025 reviewed.  Impression: There is a transverse fracture at the base of the first distal phalanx with extension into the joint.  No displacement.  No  angulation.    Assessment/Plan     Diagnoses and all orders for this visit:  Nondisplaced fracture of proximal phalanx of right great toe, initial encounter for closed fracture  -     Referral to Podiatry      Right hallux distal phalanx intra-articular fracture  The patient is evaluated and examined.  Her x-rays were reviewed independently and with the patient.  She did suffer an intra-articular fracture to the right hallux distal phalanx base; however, this is nondisplaced in nature.  She has normal range of motion to the joint and is able to ambulate without pain or discomfort.  Understands the importance of wearing supportive shoes during the healing process.  She may perform gentle range of motion exercises to the toes.  We did discuss her risk of posttraumatic arthritis in the future.  She may return to activities as tolerated.  She understands to call me should her pain or swelling worsen at any time      Lilly James DPM       [1]   Family History  Problem Relation Name Age of Onset    Breast cancer Maternal Grandmother     [2]   Current Outpatient Medications   Medication Sig Dispense Refill    buPROPion XL (Wellbutrin XL) 150 mg 24 hr tablet TAKE ONE TABLET BY MOUTH DAILY. TAKE WITH 300 MG FOR A TOTAL  MG. 90 tablet 0    buPROPion XL (Wellbutrin XL) 300 mg 24 hr tablet TAKE ONE TABLET BY MOUTH ONCE DAILY 90 tablet 0    levonorgestrel (Mirena) 21 mcg/24 hours (8 yrs) 52 mg IUD 52 mg by intrauterine route 1 time.      rimegepant (Nurtec ODT) 75 mg tablet,disintegrating Dissolve 1 tablet (75 mg) in the mouth every other day. 2 tablet 0    scopolamine (Transderm-Scop) 1 mg over 3 days patch 3 day Place 1 patch over 72 hours on the skin every 3rd day if needed (nausea). 10 patch 0    triamcinolone (Kenalog) 0.1 % cream Apply topically 2 times a day for 14 days. Apply to hands 2 times daily. Avoid face and groin. 30 g 0     No current facility-administered medications for this visit.

## 2025-06-04 ENCOUNTER — CLINICAL SUPPORT (OUTPATIENT)
Dept: AUDIOLOGY | Facility: CLINIC | Age: 50
End: 2025-06-04
Payer: COMMERCIAL

## 2025-06-04 DIAGNOSIS — H93.13 TINNITUS OF BOTH EARS: ICD-10-CM

## 2025-06-04 DIAGNOSIS — H90.3 BILATERAL HIGH FREQUENCY SENSORINEURAL HEARING LOSS: ICD-10-CM

## 2025-06-04 DIAGNOSIS — H91.93 BILATERAL HEARING LOSS, UNSPECIFIED HEARING LOSS TYPE: Primary | ICD-10-CM

## 2025-06-04 PROCEDURE — 92557 COMPREHENSIVE HEARING TEST: CPT | Performed by: AUDIOLOGIST

## 2025-06-04 PROCEDURE — 92550 TYMPANOMETRY & REFLEX THRESH: CPT | Mod: 52 | Performed by: AUDIOLOGIST

## 2025-06-04 ASSESSMENT — ENCOUNTER SYMPTOMS: OCCASIONAL FEELINGS OF UNSTEADINESS: 0

## 2025-06-04 ASSESSMENT — PAIN - FUNCTIONAL ASSESSMENT: PAIN_FUNCTIONAL_ASSESSMENT: 0-10

## 2025-06-04 ASSESSMENT — PAIN SCALES - GENERAL: PAINLEVEL_OUTOF10: 0 - NO PAIN

## 2025-06-04 NOTE — LETTER
2025     Sushant Donahue DO  [address]  [facility], [address]  [facility] [address]    Patient: Sixto De La Cruz   YOB: 1975   Date of Visit: 2025       Dear Dr. Sushant Donahue, :    Thank you for referring Sixto De La Cruz to me for evaluation. Below are my notes for this consultation.  If you have questions, please do not hesitate to call me. I look forward to following your patient along with you.       Sincerely,     Vale Varghese, KAIA, CCC-A      CC: No Recipients  ______________________________________________________________________________________    AUDIOLOGY ADULT AUDIOMETRIC EVALUATION      Name:  Sixto De La Cruz  :  1975  Age:  49 y.o.  Date of Evaluation: 25    History:  Reason for visit:  Ms. Sixto De La Cruz was seen today for an evaluation of hearing.   The patient was kindly referred by their primary care physician, Sushant Donahue DO.  Chief Complaint   Patient presents with   • Hearing Loss   • Tinnitus     The patient reported she has noticed a gradual loss of hearing sensitivity, especially in the presence of background noise. Stated when she is in a louder noisy place she will ask speakers for repetition, or will experience difficulty with the speech clarity.  Stated a few years ago she attended a very loud concert and had to spend a portion of time in the restroom in an attempt to avoid the noise. Since that incident, she has noticed a decrease in hearing and an onset of tinnitus. Concern for possible noise trauma due to the louder volume. Reported in general she has noticed certain loud noises or environments may be bothersome.   She has been able to hear and communicate well in quiet, however, endorsed additional effort and energy when attempting to listen in noise. Stated during work meetings in a conference room she may have difficulty hearing what was said. In addition, in louder social gatherings, stated others are able to hear  music and speech clearly, while she struggles. Previous hearing testing performed on 12/16/19 indicated essentially normal thresholds with a slight to mild sensorineural notched hearing loss centered around 4,000 Hz. Stated she believes her hearing has decreased since the previous test. She has become frustrated with the decrease in hearing.   Reported a constant bilateral non-pulsatile tinnitus. Stated the tinnitus typically has been managed well if there is noise in the room, however, she has noticed she may focus on the tinnitus when it is quiet. Stated she has a history of insomnia and that if she wakes up at night, the tinnitus may make it difficult for her to fall back asleep. The tinnitus has been perceived as a tonal high pitched electrical type of sound in each ear. Denied any difficulty communicating due to the tinnitus. Denied any negative thoughts, depression or anxiety due to the tinnitus, but stated it may cause some irritation.   Stated she has noticed some slight headaches, but believes it has been due to sinuses and possibly seasonal allergies.   Stated approximately two months ago she noticed some slight ear congestion with some pain, and muffled underwater hearing, but did utilize antibiotics and nasal spray, which provided relief.   Denied any current otalgia, aural fullness, ear pressure, dizziness/vertigo, ear surgery, recent falls, significant noise exposure, diabetes, chemotherapy/radiation, heart/kidney problems, recent heart attack/strokes, new onset of headaches, sinus/throat concerns, speech/memory concerns, ear drainage, or sudden hearing loss.    SCREENINGS  Steadi Fall Risk  One or more falls in the last year? No  How many Times?    Was the patient injured in the fall?    Has trouble stepping onto curb?    Advised to use a cane or walker to get around safely?    Often has to rush to toilet?    Feels unsteady when walking? No  Has lost some feeling in feet?    Often feels sad or  depressed?    Steadies self on furniture while walking at home?    Takes medicine that makes them feel lightheaded or more tired than usual?    Worried about Falling? No  Takes medicine to sleep or improve mood?    Needs to push with hands when rising from a chair?      Domestic Violence Screening  Are you currently or have you recently been threatened or abused physically, emotionally, or secually by anyone? No  Do you feel UNSAFE going back to the place you are living? No  Pain Assessment  Pain Assessment: 0-10  0-10 (Numeric) Pain Score: 0 - No pain    EVALUATION     See Audiogram    RESULTS:    Otoscopic Evaluation:   Right Ear: Otoscopy revealed a clear healthy canal and a healthy tympanic membrane was visualized.   Left Ear: Otoscopy revealed a clear healthy canal and a healthy tympanic membrane was visualized.     Immittance:  Immittance Measures: 226 Hz   Right Ear: Tympanometric testing revealed a normal type A tympanogram with normal middle ear pressure and normal static compliance.  Left Ear: Tympanometric testing revealed a normal type A tympanogram with normal middle ear pressure and normal static compliance.    Right: Ipsilateral acoustic reflexes were present at, 500-4,000 Hz, at expected sensation levels.  Left Ear: Ipsilateral acoustic reflexes were present at, 500-4,000 Hz, at expected sensation levels.     Test technique:  Pure Tone Audiometry via insert earphones    Reliability:   excellent    Pure Tone Audiometry:    Right Ear: Audiometric testing indicated normal peripheral hearing sensitivity through 2,000 Hz, sloping to a mild wide notched sensorineural hearing loss through 6,000 Hz, rising to normal peripheral hearing sensitivity above.   Left Ear:   Audiometric testing indicated normal peripheral hearing sensitivity through Audiometric testing indicated normal peripheral hearing sensitivity through 2,000 Hz, sloping to a mild wide notched sensorineural hearing loss through 6,000 Hz, rising  to normal peripheral hearing sensitivity above.       Speech Audiometry:   Right Ear:  Speech Reception Threshold (SRT) was obtained at 15 dBHL                  Word Recognition scores were excellent (96%) in quiet when words were presented at 55 dBHL  Left Ear:  Speech Reception Threshold (SRT) was obtained at  15 dBHL                  Word Recognition scores were excellent (100%) in quiet when words were presented at 55 dBHL  Testing was performed with recorded NU-6 speech words in quiet. Speech thresholds were in good agreement with the pure tone averages in each ear.     QuickSin testing indicated a score of 6.5 in the right ear.  QuickSin testing indicated a score of 5.5 in the left ear.  Results are consistent with a mild (3-7 dB) signal to noise ratio loss. Results suggest the patient may hear almost as well as those with normal hearing are able to hear in the presence of background noise.     Distortion Product Otoacoustic Emissions:        Right Ear: Present responses from 1,000-4,000 Hz.         Left Ear:  Present responses from 1,000-5,000 Hz.   Present OAEs suggest normal cochlear outer hair cell function.  Absent OAEs are consistent with some degree of hearing loss.  Objective test results are consistent with normal behavioral pure tone audiometric testing.     IMPRESSIONS:  Today's test results are consistent with a mild high frequency notched sensorineural hearing loss in each ear. The patient was counseled the softer sounds of speech are not coming in as easily and she may also notice increased listening effort when listening in background noise .  The patient was counseled with regard to the findings.    When compared to the previous test of 12/16/19, note slight decrease in each ear at 4,000 and 6,000 Hz.     Amplification needs:  Hearing aids were briefly discussed, due to the hearing loss and the patient's concerns for hearing difficulties.     RECOMMENDATIONS:  * Continue medical follow up with  Sushant Donahue, DO.   * Retest as medically indicated, or sooner if a change in hearing sensitivity or tinnitus is noticed.   * Wear hearing protection while in the presence of loud sounds.   * Use tinnitus coping strategies as needed, such as sound apps on a smart phone, utilizing calming noise in the room, running a fan at night, etc.   * Consider returning for a hearing aid evaluation to discuss amplification options and communication needs. The patient was instructed to call their insurance to check for any hearing aid benefits and to determine if [facility] was in network for hearing aids. The patient stated she was interested in the Flex Trial for hearing aids, as she has been working harder and is looking or some assistance.   * Use effective communication strategies such as asking the speaker to gain attention prior to speaking, speaking in the same room, repeating words that were heard, etc.  * Consider use of T.V. Ears, or like device, while watching television.  * Consider an appointment with Cindy Newman, at [facility] to discuss the Lenire tinnitus device.     PATIENT EDUCATION:   Discussed results and recommendations with the patient and a copy of the hearing test was provided.  Questions were addressed and the patient was encouraged to contact our department should concerns arise.  Discussed speech intelligibility, cognitive load and listening effort. The patient was counseled although there are still significant hearing abilities, the sound sounds of speech are not coming in as easily as they once did. Counseled to consider hearing aids, to help reduce the amount of listening effort required by the brain.  The patient was seen from 10:30-11:30 pm.

## 2025-06-04 NOTE — PROGRESS NOTES
AUDIOLOGY ADULT AUDIOMETRIC EVALUATION      Name:  Sixto De La Cruz  :  1975  Age:  49 y.o.  Date of Evaluation: 25    History:  Reason for visit:  Ms. Sixto De La Cruz was seen today for an evaluation of hearing.   The patient was kindly referred by their primary care physician, Sushant Donahue DO.  Chief Complaint   Patient presents with    Hearing Loss    Tinnitus     The patient reported she has noticed a gradual loss of hearing sensitivity, especially in the presence of background noise. Stated when she is in a louder noisy place she will ask speakers for repetition, or will experience difficulty with the speech clarity.  Stated a few years ago she attended a very loud concert and had to spend a portion of time in the restroom in an attempt to avoid the noise. Since that incident, she has noticed a decrease in hearing and an onset of tinnitus. Concern for possible noise trauma due to the louder volume. Reported in general she has noticed certain loud noises or environments may be bothersome.   She has been able to hear and communicate well in quiet, however, endorsed additional effort and energy when attempting to listen in noise. Stated during work meetings in a conference room she may have difficulty hearing what was said. In addition, in louder social gatherings, stated others are able to hear music and speech clearly, while she struggles. Previous hearing testing performed on 19 indicated essentially normal thresholds with a slight to mild sensorineural notched hearing loss centered around 4,000 Hz. Stated she believes her hearing has decreased since the previous test. She has become frustrated with the decrease in hearing.   Reported a constant bilateral non-pulsatile tinnitus. Stated the tinnitus typically has been managed well if there is noise in the room, however, she has noticed she may focus on the tinnitus when it is quiet. Stated she has a history of insomnia and that if she wakes up  at night, the tinnitus may make it difficult for her to fall back asleep. The tinnitus has been perceived as a tonal high pitched electrical type of sound in each ear. Denied any difficulty communicating due to the tinnitus. Denied any negative thoughts, depression or anxiety due to the tinnitus, but stated it may cause some irritation.   Stated she has noticed some slight headaches, but believes it has been due to sinuses and possibly seasonal allergies.   Stated approximately two months ago she noticed some slight ear congestion with some pain, and muffled underwater hearing, but did utilize antibiotics and nasal spray, which provided relief.   Denied any current otalgia, aural fullness, ear pressure, dizziness/vertigo, ear surgery, recent falls, significant noise exposure, diabetes, chemotherapy/radiation, heart/kidney problems, recent heart attack/strokes, new onset of headaches, sinus/throat concerns, speech/memory concerns, ear drainage, or sudden hearing loss.    SCREENINGS  Steadi Fall Risk  One or more falls in the last year? No  How many Times?    Was the patient injured in the fall?    Has trouble stepping onto curb?    Advised to use a cane or walker to get around safely?    Often has to rush to toilet?    Feels unsteady when walking? No  Has lost some feeling in feet?    Often feels sad or depressed?    Steadies self on furniture while walking at home?    Takes medicine that makes them feel lightheaded or more tired than usual?    Worried about Falling? No  Takes medicine to sleep or improve mood?    Needs to push with hands when rising from a chair?      Domestic Violence Screening  Are you currently or have you recently been threatened or abused physically, emotionally, or secually by anyone? No  Do you feel UNSAFE going back to the place you are living? No  Pain Assessment  Pain Assessment: 0-10  0-10 (Numeric) Pain Score: 0 - No pain    EVALUATION     See Audiogram    RESULTS:    Otoscopic  Evaluation:   Right Ear: Otoscopy revealed a clear healthy canal and a healthy tympanic membrane was visualized.   Left Ear: Otoscopy revealed a clear healthy canal and a healthy tympanic membrane was visualized.     Immittance:  Immittance Measures: 226 Hz   Right Ear: Tympanometric testing revealed a normal type A tympanogram with normal middle ear pressure and normal static compliance.  Left Ear: Tympanometric testing revealed a normal type A tympanogram with normal middle ear pressure and normal static compliance.    Right: Ipsilateral acoustic reflexes were present at, 500-4,000 Hz, at expected sensation levels.  Left Ear: Ipsilateral acoustic reflexes were present at, 500-4,000 Hz, at expected sensation levels.     Test technique:  Pure Tone Audiometry via insert earphones    Reliability:   excellent    Pure Tone Audiometry:    Right Ear: Audiometric testing indicated normal peripheral hearing sensitivity through 2,000 Hz, sloping to a mild wide notched sensorineural hearing loss through 6,000 Hz, rising to normal peripheral hearing sensitivity above.   Left Ear:   Audiometric testing indicated normal peripheral hearing sensitivity through Audiometric testing indicated normal peripheral hearing sensitivity through 2,000 Hz, sloping to a mild wide notched sensorineural hearing loss through 6,000 Hz, rising to normal peripheral hearing sensitivity above.       Speech Audiometry:   Right Ear:  Speech Reception Threshold (SRT) was obtained at 15 dBHL                  Word Recognition scores were excellent (96%) in quiet when words were presented at 55 dBHL  Left Ear:  Speech Reception Threshold (SRT) was obtained at  15 dBHL                  Word Recognition scores were excellent (100%) in quiet when words were presented at 55 dBHL  Testing was performed with recorded NU-6 speech words in quiet. Speech thresholds were in good agreement with the pure tone averages in each ear.     QuickSin testing indicated a score  of 6.5 in the right ear.  QuickSin testing indicated a score of 5.5 in the left ear.  Results are consistent with a mild (3-7 dB) signal to noise ratio loss. Results suggest the patient may hear almost as well as those with normal hearing are able to hear in the presence of background noise.     Distortion Product Otoacoustic Emissions:        Right Ear: Present responses from 1,000-4,000 Hz.         Left Ear:  Present responses from 1,000-5,000 Hz.   Present OAEs suggest normal cochlear outer hair cell function.  Absent OAEs are consistent with some degree of hearing loss.  Objective test results are consistent with normal behavioral pure tone audiometric testing.     IMPRESSIONS:  Today's test results are consistent with a mild high frequency notched sensorineural hearing loss in each ear. The patient was counseled the softer sounds of speech are not coming in as easily and she may also notice increased listening effort when listening in background noise .  The patient was counseled with regard to the findings.    When compared to the previous test of 12/16/19, note slight decrease in each ear at 4,000 and 6,000 Hz.     Amplification needs:  Hearing aids were briefly discussed, due to the hearing loss and the patient's concerns for hearing difficulties.     RECOMMENDATIONS:  * Continue medical follow up with Sushant Donahue DO.   * Retest as medically indicated, or sooner if a change in hearing sensitivity or tinnitus is noticed.   * Wear hearing protection while in the presence of loud sounds.   * Use tinnitus coping strategies as needed, such as sound apps on a smart phone, utilizing calming noise in the room, running a fan at night, etc.   * Consider returning for a hearing aid evaluation to discuss amplification options and communication needs. The patient was instructed to call their insurance to check for any hearing aid benefits and to determine if [facility] was in network for hearing aids. The  patient stated she was interested in the Flex Trial for hearing aids, as she has been working harder and is looking or some assistance.   * Use effective communication strategies such as asking the speaker to gain attention prior to speaking, speaking in the same room, repeating words that were heard, etc.  * Consider use of T.V. Ears, or like device, while watching television.  * Consider an appointment with Cindy Newman, at [facility] to discuss the Lenire tinnitus device.     PATIENT EDUCATION:   Discussed results and recommendations with the patient and a copy of the hearing test was provided.  Questions were addressed and the patient was encouraged to contact our department should concerns arise.  Discussed speech intelligibility, cognitive load and listening effort. The patient was counseled although there are still significant hearing abilities, the sound sounds of speech are not coming in as easily as they once did. Counseled to consider hearing aids, to help reduce the amount of listening effort required by the brain.  The patient was seen from 10:30-11:30 pm.

## 2025-06-20 ENCOUNTER — CLINICAL SUPPORT (OUTPATIENT)
Dept: AUDIOLOGY | Facility: CLINIC | Age: 50
End: 2025-06-20
Payer: COMMERCIAL

## 2025-06-20 ENCOUNTER — APPOINTMENT (OUTPATIENT)
Dept: PODIATRY | Facility: CLINIC | Age: 50
End: 2025-06-20
Payer: COMMERCIAL

## 2025-06-20 DIAGNOSIS — H93.13 TINNITUS OF BOTH EARS: ICD-10-CM

## 2025-06-20 DIAGNOSIS — H90.3 BILATERAL HIGH FREQUENCY SENSORINEURAL HEARING LOSS: Primary | ICD-10-CM

## 2025-06-20 PROCEDURE — 92700 UNLISTED ORL SERVICE/PX: CPT | Mod: AUDSP | Performed by: AUDIOLOGIST

## 2025-06-20 ASSESSMENT — PAIN SCALES - GENERAL: PAINLEVEL_OUTOF10: 0 - NO PAIN

## 2025-06-20 ASSESSMENT — PAIN - FUNCTIONAL ASSESSMENT: PAIN_FUNCTIONAL_ASSESSMENT: 0-10

## 2025-06-20 NOTE — PROGRESS NOTES
AUDIOLOGY ADULT HEARING AID FLEX TRIAL FITTING      Name:  Sixto De La Cruz  :  1975  Age:  49 y.o.  Date of Fitting 25    History:  Reason for visit:  Ms. Sixto De La Cruz was seen today for a fitting of the below hearing devices.  The patient stated at her previous appointment, she was interested in the hearing aid flex trial. Reported concern for listening in the presence of background noise, work meetings as well as loud restaurants. In addition, stated she may also consider the hearing aids for tinnitus management.     Hearing loss was reviewed. Counseled the patient that she would be utilizing the hearing aids more for the noise reduction strategies, rather then significant amplification. Counseled regarding the two different types of trial products would be the Audeo L90R devices or the Audeo I70 Sphere aids. Discussed the differences between the hearing aids as well as pricing. The patient stated at this time she would like to consider the less expensive option, as she still has considerable hearing abilities.     The devices were programmed to meet the patient's initial needs.  Ran the feedback manager in each ear, with no feedback noticed in the office. Additional programming was made to assist with more noise reduction processing. Volume control was activated and the patient was counseled on the usage.  Reported the aids sounded comfortable and clear.    Right Ear:   Make/Model: Phonak Audeo L90-R   Dome/: small vented dome and #1 M    Serial Number: [id number]     Left Ear:  Make/Model: Phonak Audeo L90-R   Dome/: small vented dome and #1 M    Serial Number: [id number]  Using a large  for lithium ion batteries.   Aids dispensed on 25 and Flex Trial fee PIF    Device instructions were provided covering the following points:   * Battery insertion and removal into the -the patient understood.   * Device insertion and removal-was able to insert/remove  without difficulty and will monitor the fit and add retention lines if needed.  * Counseled to use the phone as normal without pressing anything additional on the hearing aid.     Patient educated on:  1. Use/wear time for a minimum of 10 hours day.    2. Realistic expectations and need to return for fine-tuning  3. Communicating strategies to optimize hearing aid performance    Flex Trial agreement was completed and a copy was given to the patient.   Discussed utilizing the devices in as many situations as possible. Also recommended if the patient wishes to purchase hearing aids, she consider contacting the office prior so aids can be ordered for the return appointment.   The patient expressed understanding for all fees and will return for the hearing aid fitting.   Insurance was contacted after the patient's appointment:  Per Cristal with [facility] the current insurance plan only covers hearing aids for patients below the age of 21. Call reference # [id number].    RECOMMENDATIONS:  * Continue medical follow up with managing physician.   * Wear hearing protection while in the presence of loud sounds.   * Continued use of new flex trial hearing aids.   * Use effective communication strategies such as asking the speaker to gain attention prior to speaking, speaking in the same room, repeating words that were heard, etc.  * Return for scheduled follow up appointment.     PATIENT EDUCATION:   Questions were addressed and the patient was encouraged to contact our department should concerns arise.  The patient was seen from 2:30-3:15 pm.

## 2025-07-09 DIAGNOSIS — F32.0 CURRENT MILD EPISODE OF MAJOR DEPRESSIVE DISORDER WITHOUT PRIOR EPISODE: ICD-10-CM

## 2025-07-09 DIAGNOSIS — J30.1 SEASONAL ALLERGIC RHINITIS DUE TO POLLEN: Primary | ICD-10-CM

## 2025-07-09 DIAGNOSIS — F33.41 RECURRENT MAJOR DEPRESSIVE DISORDER, IN PARTIAL REMISSION: ICD-10-CM

## 2025-07-09 RX ORDER — BUPROPION HYDROCHLORIDE 300 MG/1
300 TABLET ORAL DAILY
Qty: 90 TABLET | Refills: 1 | Status: SHIPPED | OUTPATIENT
Start: 2025-07-09

## 2025-07-09 RX ORDER — FLUTICASONE PROPIONATE 50 MCG
1 SPRAY, SUSPENSION (ML) NASAL DAILY
Qty: 16 G | Refills: 5 | Status: SHIPPED | OUTPATIENT
Start: 2025-07-09 | End: 2026-07-09

## 2025-07-09 RX ORDER — BUPROPION HYDROCHLORIDE 150 MG/1
TABLET ORAL
Qty: 90 TABLET | Refills: 1 | Status: SHIPPED | OUTPATIENT
Start: 2025-07-09

## 2025-07-18 ENCOUNTER — CLINICAL SUPPORT (OUTPATIENT)
Dept: AUDIOLOGY | Facility: CLINIC | Age: 50
End: 2025-07-18

## 2025-07-18 DIAGNOSIS — H90.3 BILATERAL HIGH FREQUENCY SENSORINEURAL HEARING LOSS: Primary | ICD-10-CM

## 2025-07-18 NOTE — PROGRESS NOTES
AUDIOLOGY ADULT HEARING AID FLEX TRIAL FITTING-FOLLOW UP      Name:  Sixto De La Cruz  :  1975  Age:  50 y.o.  Date of Visit: 25    History:  Reason for visit:  Ms. De La Cruz is seen today for a hearing aid check.  Chief Complaint   Patient presents with    Hearing Loss      The patient returned to discuss the flex trial devices.     Procedure:   Right Ear:              Make/Model: Phonak Audeo L90-R              Dome/: small vented dome and #1 M               Serial Number: [id number]                Left Ear:  Make/Model: Phonak Audeo L90-R              Dome/: small vented dome and #1 M               Serial Number: [id number]  Using a large  for lithium ion batteries.   Aids dispensed on 25 and Flex Trial fee PIF    Stated she did not wear the flex trial devices often during the trial period. Mentioned she did not really like the sensation of having something inside her ears and did not really want to wear the aids.  Stated at this time she believes she may not require any hearing device and will continue to utilize good communication strategies.   She will continue to monitor her hearing and return as needed for hearing testing.   Hearing aids were returned in good condition.     RECOMMENDATIONS:  * Continue medical follow up with managing physician.   * Wear hearing protection while in the presence of loud sounds.   * Use effective communication strategies such as asking the speaker to gain attention prior to speaking, speaking in the same room, repeating words that were heard, etc.  * Return annually or as needed for hearing testing and hearing aid programming.    PATIENT EDUCATION:   Questions were addressed and the patient was encouraged to contact our department should concerns arise.  The patient was seen from  11:00-11:15 am.

## 2025-08-07 ENCOUNTER — PHARMACY VISIT (OUTPATIENT)
Dept: PHARMACY | Facility: CLINIC | Age: 50
End: 2025-08-07
Payer: COMMERCIAL

## 2025-08-07 ENCOUNTER — OFFICE VISIT (OUTPATIENT)
Dept: OBSTETRICS AND GYNECOLOGY | Facility: CLINIC | Age: 50
End: 2025-08-07
Payer: COMMERCIAL

## 2025-08-07 VITALS
SYSTOLIC BLOOD PRESSURE: 138 MMHG | WEIGHT: 166 LBS | DIASTOLIC BLOOD PRESSURE: 74 MMHG | HEIGHT: 67 IN | BODY MASS INDEX: 26.06 KG/M2

## 2025-08-07 DIAGNOSIS — N95.1 PERIMENOPAUSE: ICD-10-CM

## 2025-08-07 DIAGNOSIS — Z12.31 ENCOUNTER FOR SCREENING MAMMOGRAM FOR MALIGNANT NEOPLASM OF BREAST: Primary | ICD-10-CM

## 2025-08-07 PROCEDURE — RXMED WILLOW AMBULATORY MEDICATION CHARGE

## 2025-08-07 PROCEDURE — 1036F TOBACCO NON-USER: CPT | Performed by: ADVANCED PRACTICE MIDWIFE

## 2025-08-07 PROCEDURE — 3008F BODY MASS INDEX DOCD: CPT | Performed by: ADVANCED PRACTICE MIDWIFE

## 2025-08-07 PROCEDURE — 99213 OFFICE O/P EST LOW 20 MIN: CPT | Performed by: ADVANCED PRACTICE MIDWIFE

## 2025-08-07 RX ORDER — ESTRADIOL 0.03 MG/D
1 FILM, EXTENDED RELEASE TRANSDERMAL 2 TIMES WEEKLY
Qty: 24 PATCH | Refills: 0 | Status: SHIPPED | OUTPATIENT
Start: 2025-08-07 | End: 2026-08-07

## 2025-08-07 ASSESSMENT — PAIN SCALES - GENERAL: PAINLEVEL_OUTOF10: 0-NO PAIN

## 2025-08-07 NOTE — ASSESSMENT & PLAN NOTE
Pt. Strongly desires to start MHT given current symptoms  Discussed transdermal, information given, expectations-RX sent for Gely waller twice weekly   Referral to clinical pharmacy

## 2025-08-07 NOTE — PATIENT INSTRUCTIONS
"How to Use the Estradiol Patch   Application: Fully clean and dry your skin (may use an alochol wipe), first, and select an area of unbroken skin. Avoid the breast area; typically, good places to choose include the lower stomach or upper buttocks. Once affixed, the patch will release estrogen into your body.   Patches do not \"re-stick\" once they are applied they cannot be moved to another location.   Do not apply direct heat to patch (such as a heating pad). Do not cut patches. Do not submerge in water for long periods of time.   If going swimming or having trouble with patch sticking may consider applying a Tegaderm patch over to protect the patch.   When removing the patch, fold in half, and discard in the trash can away from children or pets (DO NOT FLUSH).    "

## 2025-08-07 NOTE — PROGRESS NOTES
Subjective   Patient ID: Sixto De La Cruz is a 50 y.o. female who presents for HRT.  HPI    Pt. 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 She is normal weight.     Neurological:      Mental Status: She is alert.     Psychiatric:         Mood and Affect: Mood normal.         Behavior: Behavior normal.         Judgment: Judgment normal.         Assessment/Plan   Problem List Items Addressed This Visit           ICD-10-CM       Medium    Encounter for screening mammogram for malignant neoplasm of breast - Primary Z12.31    Relevant Orders    BI mammo bilateral screening tomosynthesis    Perimenopause N95.1    Pt. Strongly desires to start MHT given current symptoms  Discussed transdermal, information given, expectations-RX sent for Vivelle dot twice weekly   Referral to clinical pharmacy            Relevant Medications    estradiol (Vivelle-DOT) 0.025 mg/24 hr patch    Other Relevant Orders    Referral to Clinical Pharmacy            ESA Hassan 08/07/25 1:07 PM

## 2025-09-05 ENCOUNTER — APPOINTMENT (OUTPATIENT)
Dept: PHARMACY | Facility: HOSPITAL | Age: 50
End: 2025-09-05
Payer: COMMERCIAL

## 2025-09-05 ENCOUNTER — PHARMACY VISIT (OUTPATIENT)
Dept: PHARMACY | Facility: CLINIC | Age: 50
End: 2025-09-05
Payer: COMMERCIAL

## 2025-09-05 PROCEDURE — RXMED WILLOW AMBULATORY MEDICATION CHARGE

## 2025-09-26 ENCOUNTER — APPOINTMENT (OUTPATIENT)
Dept: RADIOLOGY | Facility: HOSPITAL | Age: 50
End: 2025-09-26
Payer: COMMERCIAL

## 2025-10-06 ENCOUNTER — APPOINTMENT (OUTPATIENT)
Dept: PHARMACY | Facility: HOSPITAL | Age: 50
End: 2025-10-06
Payer: COMMERCIAL